# Patient Record
Sex: MALE | Race: WHITE | NOT HISPANIC OR LATINO | Employment: OTHER | URBAN - METROPOLITAN AREA
[De-identification: names, ages, dates, MRNs, and addresses within clinical notes are randomized per-mention and may not be internally consistent; named-entity substitution may affect disease eponyms.]

---

## 2019-10-10 ENCOUNTER — HOSPITAL ENCOUNTER (OUTPATIENT)
Dept: GENERAL RADIOLOGY | Facility: HOSPITAL | Age: 49
Discharge: HOME | End: 2019-10-10
Attending: FAMILY MEDICINE

## 2019-10-10 ENCOUNTER — CONVERSION ENCOUNTER (OUTPATIENT)
Dept: FAMILY MEDICINE CLINIC | Facility: CLINIC | Age: 49
End: 2019-10-10

## 2019-10-10 ENCOUNTER — OFFICE VISIT CONVERTED (OUTPATIENT)
Dept: FAMILY MEDICINE CLINIC | Facility: CLINIC | Age: 49
End: 2019-10-10
Attending: FAMILY MEDICINE

## 2019-10-10 ENCOUNTER — HOSPITAL ENCOUNTER (OUTPATIENT)
Dept: FAMILY MEDICINE CLINIC | Facility: CLINIC | Age: 49
Discharge: HOME | End: 2019-10-10
Attending: FAMILY MEDICINE

## 2019-10-10 LAB
ALBUMIN SERPL-MCNC: 4.8 G/DL (ref 3.5–5)
ALBUMIN/GLOB SERPL: 2.1 {RATIO} (ref 1.4–2.6)
ALP SERPL-CCNC: 83 U/L (ref 53–128)
ALT SERPL-CCNC: 34 U/L (ref 10–40)
ANION GAP SERPL CALC-SCNC: 17 MMOL/L (ref 8–19)
AST SERPL-CCNC: 36 U/L (ref 15–50)
BILIRUB SERPL-MCNC: 0.5 MG/DL (ref 0.2–1.3)
BUN SERPL-MCNC: 10 MG/DL (ref 5–25)
BUN/CREAT SERPL: 9 {RATIO} (ref 6–20)
CALCIUM SERPL-MCNC: 9.8 MG/DL (ref 8.7–10.4)
CHLORIDE SERPL-SCNC: 105 MMOL/L (ref 99–111)
CHOLEST SERPL-MCNC: 142 MG/DL (ref 107–200)
CHOLEST/HDLC SERPL: 3 {RATIO} (ref 3–6)
CONV CO2: 27 MMOL/L (ref 22–32)
CONV TOTAL PROTEIN: 7.1 G/DL (ref 6.3–8.2)
CREAT UR-MCNC: 1.07 MG/DL (ref 0.7–1.2)
GFR SERPLBLD BASED ON 1.73 SQ M-ARVRAT: >60 ML/MIN/{1.73_M2}
GLOBULIN UR ELPH-MCNC: 2.3 G/DL (ref 2–3.5)
GLUCOSE SERPL-MCNC: 96 MG/DL (ref 70–99)
HDLC SERPL-MCNC: 48 MG/DL (ref 40–60)
LDLC SERPL CALC-MCNC: 76 MG/DL (ref 70–100)
OSMOLALITY SERPL CALC.SUM OF ELEC: 297 MOSM/KG (ref 273–304)
POTASSIUM SERPL-SCNC: 4.7 MMOL/L (ref 3.5–5.3)
SODIUM SERPL-SCNC: 144 MMOL/L (ref 135–147)
T4 FREE SERPL-MCNC: 1.5 NG/DL (ref 0.9–1.8)
TRIGL SERPL-MCNC: 91 MG/DL (ref 40–150)
TSH SERPL-ACNC: 2.2 M[IU]/L (ref 0.27–4.2)
VLDLC SERPL-MCNC: 18 MG/DL (ref 5–37)

## 2019-10-29 ENCOUNTER — OFFICE VISIT CONVERTED (OUTPATIENT)
Dept: NEUROLOGY | Facility: CLINIC | Age: 49
End: 2019-10-29
Attending: PSYCHIATRY & NEUROLOGY

## 2020-09-04 ENCOUNTER — OFFICE VISIT CONVERTED (OUTPATIENT)
Dept: FAMILY MEDICINE CLINIC | Facility: CLINIC | Age: 50
End: 2020-09-04
Attending: FAMILY MEDICINE

## 2020-12-07 ENCOUNTER — OFFICE VISIT CONVERTED (OUTPATIENT)
Dept: FAMILY MEDICINE CLINIC | Facility: CLINIC | Age: 50
End: 2020-12-07
Attending: FAMILY MEDICINE

## 2020-12-07 ENCOUNTER — HOSPITAL ENCOUNTER (OUTPATIENT)
Dept: FAMILY MEDICINE CLINIC | Facility: CLINIC | Age: 50
Discharge: HOME | End: 2020-12-07
Attending: FAMILY MEDICINE

## 2020-12-07 LAB
BASOPHILS # BLD AUTO: 0.04 10*3/UL (ref 0–0.2)
BASOPHILS NFR BLD AUTO: 0.6 % (ref 0–3)
CONV ABS IMM GRAN: 0.01 10*3/UL (ref 0–0.2)
CONV IMMATURE GRAN: 0.2 % (ref 0–1.8)
DEPRECATED RDW RBC AUTO: 37.3 FL (ref 35.1–43.9)
EOSINOPHIL # BLD AUTO: 0.19 10*3/UL (ref 0–0.7)
EOSINOPHIL # BLD AUTO: 3 % (ref 0–7)
ERYTHROCYTE [DISTWIDTH] IN BLOOD BY AUTOMATED COUNT: 11.9 % (ref 11.6–14.4)
HCT VFR BLD AUTO: 49.8 % (ref 42–52)
HGB BLD-MCNC: 16.6 G/DL (ref 14–18)
LYMPHOCYTES # BLD AUTO: 2.41 10*3/UL (ref 1–5)
LYMPHOCYTES NFR BLD AUTO: 38.5 % (ref 20–45)
MCH RBC QN AUTO: 28.4 PG (ref 27–31)
MCHC RBC AUTO-ENTMCNC: 33.3 G/DL (ref 33–37)
MCV RBC AUTO: 85.1 FL (ref 80–96)
MONOCYTES # BLD AUTO: 0.43 10*3/UL (ref 0.2–1.2)
MONOCYTES NFR BLD AUTO: 6.9 % (ref 3–10)
NEUTROPHILS # BLD AUTO: 3.18 10*3/UL (ref 2–8)
NEUTROPHILS NFR BLD AUTO: 50.8 % (ref 30–85)
NRBC CBCN: 0 % (ref 0–0.7)
PLATELET # BLD AUTO: 235 10*3/UL (ref 130–400)
PMV BLD AUTO: 10.3 FL (ref 9.4–12.4)
RBC # BLD AUTO: 5.85 10*6/UL (ref 4.7–6.1)
WBC # BLD AUTO: 6.26 10*3/UL (ref 4.8–10.8)

## 2020-12-08 LAB
ALBUMIN SERPL-MCNC: 5 G/DL (ref 3.5–5)
ALBUMIN/GLOB SERPL: 2.2 {RATIO} (ref 1.4–2.6)
ALP SERPL-CCNC: 86 U/L (ref 53–128)
ALT SERPL-CCNC: 40 U/L (ref 10–40)
ANION GAP SERPL CALC-SCNC: 16 MMOL/L (ref 8–19)
AST SERPL-CCNC: 45 U/L (ref 15–50)
BILIRUB SERPL-MCNC: 1.13 MG/DL (ref 0.2–1.3)
BUN SERPL-MCNC: 17 MG/DL (ref 5–25)
BUN/CREAT SERPL: 14 {RATIO} (ref 6–20)
CALCIUM SERPL-MCNC: 10 MG/DL (ref 8.7–10.4)
CHLORIDE SERPL-SCNC: 101 MMOL/L (ref 99–111)
CHOLEST SERPL-MCNC: 138 MG/DL (ref 107–200)
CHOLEST/HDLC SERPL: 3.2 {RATIO} (ref 3–6)
CONV CO2: 27 MMOL/L (ref 22–32)
CONV TOTAL PROTEIN: 7.3 G/DL (ref 6.3–8.2)
CREAT UR-MCNC: 1.24 MG/DL (ref 0.7–1.2)
GFR SERPLBLD BASED ON 1.73 SQ M-ARVRAT: >60 ML/MIN/{1.73_M2}
GLOBULIN UR ELPH-MCNC: 2.3 G/DL (ref 2–3.5)
GLUCOSE SERPL-MCNC: 85 MG/DL (ref 70–99)
HDLC SERPL-MCNC: 43 MG/DL (ref 40–60)
LDLC SERPL CALC-MCNC: 76 MG/DL (ref 70–100)
OSMOLALITY SERPL CALC.SUM OF ELEC: 291 MOSM/KG (ref 273–304)
POTASSIUM SERPL-SCNC: 4.3 MMOL/L (ref 3.5–5.3)
SODIUM SERPL-SCNC: 140 MMOL/L (ref 135–147)
T4 FREE SERPL-MCNC: 1.7 NG/DL (ref 0.9–1.8)
TRIGL SERPL-MCNC: 93 MG/DL (ref 40–150)
TSH SERPL-ACNC: 2.7 M[IU]/L (ref 0.27–4.2)
VLDLC SERPL-MCNC: 19 MG/DL (ref 5–37)

## 2021-05-13 NOTE — PROGRESS NOTES
Progress Note      Patient Name: Darrick Mooney   Patient ID: [id number]   Sex: Male   YOB: 1970        Visit Date: September 4, 2020    Provider: Wodo Martines DO   Location: [facility]   Location Address: [address] [place] [address]  [id number]   Location Phone: [phone]          Chief Complaint  · Follow up - RLS, HLD, Hypothyroid, Migraine Headache, Pain/numbness/tingling in both wrists   · pt c/o lower back pain worsening       History Of Present Illness  Darrick Mooney is a 50 year old male who presents for evaluation and treatment of: Hypothyroid and HLD. He states that he has been taking both meds daily as prescribed.      He states that his lower back is worse. He states that he got up off the sofa and had onset of pain. The pain radiates down one leg or the other. On the right side it radiates to the front. It is worse with twisting motion. He had some old Tramadol which he has been using, plus herbal remedy. He is also taking his daily Celebrex and Tylenol.       Past Medical History  Disease Name Date Onset Notes   Hyperlipemia --  --    Hypothyroid --  --    Labral tear of hip joint --  --    Migraine headache --  --    RLS (restless legs syndrome) --  --    Sciatica --  --    Smokeless tobacco use 10/10/2019 encouraged him to quit.          Past Surgical History  Procedure Name Date Notes   Colonoscopy 6/5/2011 --    Elbow Surgery 2014 left   nose 2002 deviated septum          Medication List  Name Date Started Instructions   amitriptyline 10 mg oral tablet 10/10/2019 take 1 tablet (10 mg) by oral route once daily at bedtime for 90 days   atorvastatin 20 mg oral tablet 10/10/2019 take 1 tablet (20 mg) by oral route once daily at bedtime for 90 days   Celebrex 200 mg oral capsule 10/10/2019 take 1 capsule (200 mg) by oral route once daily for 90 days   Synthroid 112 mcg oral tablet 10/10/2019 take 1 tablet (112 mcg) by oral route once  "daily for 90 days   tramadol 50 mg oral tablet  take 0.5 tablet by oral route every 4-6 hours as needed         Allergy List  Allergen Name Date Reaction Notes   NO KNOWN DRUG ALLERGIES --  --  --          Family Medical History  Disease Name Relative/Age Notes   Family history of cancer Grandmother (paternal)/  Uncle/   --    Family history of heart disease Aunt/  Grandmother (maternal)/  Mother/  Uncle/   --    Family history of diabetes mellitus Grandmother (maternal)/   --    Family history of hypertension Grandmother (maternal)/   --          Social History  Finding Status Start/Stop Quantity Notes   Alcohol Current some day --/-- --  2 ounces of hard liqour per week    Tobacco Current every day --/-- 1can e2d smokless chew tobacco         Immunizations  NameDate Admin Mfg Trade Name Lot Number Route Inj VIS Given VIS Publication   Xliahsasi56/30/2019 [employer] Fluarix, quadrivalent, preservative free [id number] NE NE 09/30/2019    Comments: doug recivied at [facility]         Review of Systems  · Constitutional  o Denies  o : fatigue, fever, chills  · Eyes  o Denies  o : changes in vision  · HENT  o Admits  o : headaches  · Cardiovascular  o Denies  o : chest pain, irregular heart beats, rapid heart rate  · Respiratory  o Denies  o : shortness of breath, wheezing, cough, dyspnea on exertion  · Gastrointestinal  o Admits  o : abdominal pain  o Denies  o : nausea, vomiting, diarrhea, constipation  · Genitourinary  o Admits  o : frequency, dysuria, nocturia, decreased stream  o Denies  o : urgency, hematuria  · Musculoskeletal  o Admits  o : back pain      Vitals  Date Time BP Position Site L\R Cuff Size HR RR TEMP (F) WT  HT  BMI kg/m2 BSA m2 O2 Sat        10/10/2019 09:11 /76 Sitting    65 - R  97.9 184lbs 6oz 5'  9\" 27.23 2.02 100 %    09/04/2020 10:43 /81 Sitting    82 - R  98.2 192lbs 2oz 5'  9\" 28.37 2.06 98 %          Physical Examination  · Constitutional  o Appearance  o : well-nourished, " well developed, alert, in no acute distress, well-tended appearance  · Head and Face  o Head  o :   § Inspection  § : atraumatic, normocephalic  o Face  o :   § Inspection  § : no facial lesions  o HEENT  o : Unremarkable  · Eyes  o Conjunctivae  o : conjunctivae normal  o Sclerae  o : sclerae white  o Pupils and Irises  o : pupils equal and round, pupils reactive to light bilaterally  o Eyelids/Ocular Adnexae  o : eyelid appearance normal  · Ears, Nose, Mouth and Throat  o Ears  o :   § External Ears  § : appearance within normal limits, no lesions present  § Otoscopic Examination  § : tympanic membrane appearance within normal limits bilaterally without perforations, mobility normal  o Nose  o :   § External Nose  § : appearance normal  o Oral Cavity  o :   § Oral Mucosa  § : oral mucosa normal  § Lips  § : lip appearance normal  § Teeth  § : normal dentition for age  § Gums  § : gums pink, non-swollen, no bleeding present  § Tongue  § : tongue appearance normal  § Palate  § : hard palate normal, soft palate appearance normal  o Throat  o :   § Oropharynx  § : no inflammation or lesions present, tonsils within normal limits  · Neck  o Inspection/Palpation  o : normal appearance, no masses or tenderness, trachea midline  o Thyroid  o : gland size normal, nontender, no nodules or masses present on palpation  · Respiratory  o Respiratory Effort  o : breathing unlabored  o Auscultation of Lungs  o : normal breath sounds  · Cardiovascular  o Heart  o :   § Auscultation of Heart  § : regular rate, normal rhythm, no murmurs present  o Peripheral Vascular System  o :   § Extremities  § : no edema  · Gastrointestinal  o Abdominal Examination  o : diffuse abdominal tenderness to palpation present, normal bowel sounds, tone normal without rigidity or guarding, no masses present  o Liver and spleen  o : no hepatomegaly present  o Special Tests  o : (+) CVA tenderness  · Lymphatic  o Neck  o : no lymphadenopathy    · Back  o Inspection  o : no deformities  o Palpation  o : tednerness present           Results  · In-Office Procedures  o Lab procedure  § IOP - Urinalysis without Microscopy (Clinitek) [facility] ([id number])   § Color Ur: Yellow   § Clarity Ur: Clear   § Glucose Ur Ql Strip: Negative   § Bilirub Ur Ql Strip: Negative   § Ketones Ur Ql Strip: Negative   § Sp Gr Ur Qn: 1.025   § Hgb Ur Ql Strip: Negative   § pH Ur-LsCnc: 6.0   § Prot Ur Ql Strip: Negative   § Urobilinogen Ur Strip-mCnc: 0.2 E.U./dL   § Nitrite Ur Ql Strip: Negative   § WBC Est Ur Ql Strip: Negative       Assessment  · Screening for depression     V79.0/Z13.89  · BPH (benign prostatic hyperplasia)     600.00/N40.0  will add Flomax  · Hypothyroid     244.9/E03.9  will update his lipid profile  · RLS (restless legs syndrome)     333.94/G25.81  He stopped the meds, but had severe cramps  · Hyperlipemia     272.4/E78.5  will update   · Migraine headache     346.90/G43.909  · Sciatica     724.3/M54.30  IF his UA is negative then will treat as such  · Costovertebral angle tenderness     724.5/M54.9  will check a UA. He does have a h/o kidney stones      Plan  · Orders  o ACO-18: Positive screen for clinical depression using a standardized tool and a follow-up plan documented () - V79.0/Z13.89 - 09/04/2020  o CMP [facility] ([id number]) - 272.4/E78.5 - 09/04/2020  o Lipid Panel [facility] ([id number]) - 244.9/E03.9, 272.4/E78.5 - 09/04/2020  o Thyroid Profile (THYII, 58631, 49451) - 244.9/E03.9 - 09/04/2020  o ACO-39: Current medications updated and reviewed () - - 09/04/2020  · Medications  o cyclobenzaprine 5 mg oral tablet   SIG: take 1 tablet (5 mg) by oral route 3 times per day for 7 days   DISP: (21) tablets with 0 refills  Prescribed on 09/04/2020     o Medrol (Hong) 4 mg oral tablets,dose pack   SIG: take by oral route as directed per package instructions for 6 days   DISP: (1) 21 ct dose-pack with 0 refills  Prescribed on 09/04/2020     o tamsulosin 0.4 mg oral capsule    SIG: take 1 capsule (0.4 mg) by oral route once daily 1/2 hour following the same meal each day for 90 days   DISP: (90) capsules with 1 refills  Prescribed on 09/04/2020     o Medications have been Reconciled  o Transition of Care or Provider Policy  · Instructions  o Depression Screen completed and scanned into the EMR under the designated folder within the patient's documents.  o Today's PHQ-9 result is 6  o Patient is taking medications as prescribed and doing well.   o Take all medications as prescribed/directed.  o Patient instructed/educated on their diet and exercise program.  o Patient was educated/instructed on their diagnosis, treatment and medications prior to discharge from the clinic today.  o Patient instructed to seek medical attention urgently for new or worsening symptoms.  o Call the office with any concerns or questions.  o Bring all medicines with their bottles to each office visit.  · Disposition  o Call or Return if symptoms worsen or persist.  o Return Visit Request in/on 3 months +/- 2 days ([id number]).            Electronically Signed by: Wood Martines, DO -Author on September 4, 2020 12:03:14 PM

## 2021-05-13 NOTE — PROGRESS NOTES
Progress Note      Patient Name: Darrick Mooney   Patient ID: [id number]   Sex: Male   YOB: 1970    Primary Care Provider: Wood Martines DO    Visit Date: December 7, 2020    Provider: Wood Martines DO   Location: [facility]   Location Address: [address] [place] [address]  [id number]   Location Phone: [phone]          Chief Complaint  · f/u sciatica and BPH      History Of Present Illness  Darrick Mooney is a 50 year old /White male who presents for evaluation and treatment of: sciatica. He states that his back pain continues. It is worse after prolonged sitting. He has been to Pain Management and had injections with some transient benefit.      BPH Last visit added Flomax and his urine stream is better. He has retrograde ejaculation.       Past Medical History  Disease Name Date Onset Notes   Hyperlipemia --  --    Hypothyroid --  --    Labral tear of hip joint --  --    Migraine headache --  --    RLS (restless legs syndrome) --  --    Sciatica --  --    Smokeless tobacco use 10/10/2019 encouraged him to quit.          Past Surgical History  Procedure Name Date Notes   Colonoscopy 6/5/2011 --    Elbow Surgery 2014 left   nose 2002 deviated septum          Medication List  Name Date Started Instructions   amitriptyline 10 mg oral tablet 09/04/2020 take 1 tablet (10 mg) by oral route once daily at bedtime for 90 days   atorvastatin 20 mg oral tablet 09/04/2020 take 1 tablet (20 mg) by oral route once daily at bedtime for 90 days   Celebrex 200 mg oral capsule 09/04/2020 take 1 capsule (200 mg) by oral route once daily for 90 days   Synthroid 112 mcg oral tablet 09/04/2020 take 1 tablet (112 mcg) by oral route once daily for 90 days   tamsulosin 0.4 mg oral capsule 09/04/2020 take 1 capsule (0.4 mg) by oral route once daily 1/2 hour following the same meal each day for 90 days         Allergy List  Allergen Name Date Reaction Notes  "  NO KNOWN DRUG ALLERGIES --  --  --        Allergies Reconciled  Family Medical History  Disease Name Relative/Age Notes   Family history of cancer Grandmother (paternal)/  Uncle/   --    Family history of heart disease Aunt/  Grandmother (maternal)/  Mother/  Uncle/   --    Family history of diabetes mellitus Grandmother (maternal)/   --    Family history of hypertension Grandmother (maternal)/   --          Social History  Finding Status Start/Stop Quantity Notes   Alcohol Current some day --/-- --  2 ounces of hard liqour per week    Smokeless tobacco --  --/-- 1 can every 2D --    Tobacco Never --/-- 1can e2d smokless chew tobacco         Immunizations  NameDate Admin Mfg Trade Name Lot Number Route Inj VIS Given VIS Publication   Iqhchxqnv25/13/2020 [facility] Fluzone Quadrivalent [id number] IM RD 10/13/2020 08/15/2019   Comments: [facility]: 94108-763-23         Review of Systems  · Constitutional  o Admits  o : fatigue  · Eyes  o Denies  o : changes in vision  · HENT  o Denies  o : headaches  · Cardiovascular  o Denies  o : chest pain, irregular heart beats, rapid heart rate  · Respiratory  o Denies  o : shortness of breath, wheezing, cough, dyspnea on exertion  · Gastrointestinal  o Denies  o : nausea, vomiting, bloating  · Musculoskeletal  o Admits  o : back pain      Vitals  Date Time BP Position Site L\R Cuff Size HR RR TEMP (F) WT  HT  BMI kg/m2 BSA m2 O2 Sat FR L/min FiO2 HC       10/10/2019 09:11 /76 Sitting    65 - R  97.9 184lbs 6oz 5'  9\" 27.23 2.02 100 %  21%    09/04/2020 10:43 /81 Sitting    82 - R  98.2 192lbs 2oz 5'  9\" 28.37 2.06 98 %  21%    12/07/2020 10:35 /82 Sitting    60 - R  97.6 195lbs 16oz 5'  9\" 28.94 2.08 100 %  21%          Physical Examination  · Constitutional  o Appearance  o : well-nourished, well developed, alert, in no acute distress, well-tended appearance  · Head and Face  o Head  o :   § Inspection  § : atraumatic, normocephalic  o Face  o :   § Inspection  § : no " facial lesions  o HEENT  o : Unremarkable  · Eyes  o Conjunctivae  o : conjunctivae normal  o Sclerae  o : sclerae white  o Pupils and Irises  o : pupils equal and round, pupils reactive to light bilaterally  o Eyelids/Ocular Adnexae  o : eyelid appearance normal  · Ears, Nose, Mouth and Throat  o Ears  o :   § External Ears  § : appearance within normal limits, no lesions present  § Otoscopic Examination  § : tympanic membrane appearance within normal limits bilaterally without perforations, mobility normal  o Nose  o :   § External Nose  § : appearance normal  o Oral Cavity  o :   § Oral Mucosa  § : oral mucosa normal  § Lips  § : lip appearance normal  § Teeth  § : normal dentition for age  § Gums  § : gums pink, non-swollen, no bleeding present  § Tongue  § : tongue appearance normal  § Palate  § : hard palate normal, soft palate appearance normal  o Throat  o :   § Oropharynx  § : no inflammation or lesions present, tonsils within normal limits  · Neck  o Inspection/Palpation  o : normal appearance, no masses or tenderness, trachea midline  o Thyroid  o : gland size normal, nontender, no nodules or masses present on palpation  · Respiratory  o Respiratory Effort  o : breathing unlabored  o Auscultation of Lungs  o : normal breath sounds  · Cardiovascular  o Heart  o :   § Auscultation of Heart  § : regular rate, normal rhythm, no murmurs present  o Peripheral Vascular System  o :   § Extremities  § : no edema  · Lymphatic  o Neck  o : no lymphadenopathy           Assessment  · BPH (benign prostatic hyperplasia)     600.00/N40.0  improved  · Fatigue     780.79/R53.83  will update labs  · Hypothyroid     244.9/E03.9  Will update his labs  · Hyperlipemia     272.4/E78.5  will update his labs  · Sciatica     724.3/M54.30  This is persistent. I offered Pain Management again, but has not given him longer term relief therefore he declined. Previously he used Tramadol at the start of his day. Typically no more then  once daily      Plan  · Orders  o CBC with Auto Diff [facility] ([id number]) - 780.79/R53.83 - 12/07/2020  o CMP [facility] ([id number]) - 272.4/E78.5, 780.79/R53.83 - 12/07/2020  o Lipid Panel [facility] ([id number]) - 272.4/E78.5 - 12/07/2020  o Thyroid Profile (51819, 72327, THYII) - 244.9/E03.9, 272.4/E78.5, 780.79/R53.83 - 12/07/2020  o ACO-39: Current medications updated and reviewed (1159F, ) - - 12/07/2020  o ACO-14: Influenza immunization administered or previously received [facility] () - - 12/07/2020  · Medications  o Naprosyn 500 mg oral tablet   SIG: take 1 tablet (500 mg) by oral route 2 times per day with food for 90 days   DISP: (180) Tablet with 3 refills  Prescribed on 12/07/2020     o tramadol 50 mg oral tablet   SIG: take 1 tablet by oral route every 8 hours as needed for 30 days   DISP: (50) Tablet with 5 refills  Prescribed on 12/07/2020     o Celebrex 200 mg oral capsule   SIG: take 1 capsule (200 mg) by oral route once daily for 90 days   DISP: (90) capsule with 3 refills  Discontinued on 12/07/2020     o Medications have been Reconciled  o Transition of Care or Provider Policy  · Instructions  o Patient is taking medications as prescribed and doing well.   o Take all medications as prescribed/directed.  o Patient instructed/educated on their diet and exercise program.  o Patient was educated/instructed on their diagnosis, treatment and medications prior to discharge from the clinic today.  o Patient instructed to seek medical attention urgently for new or worsening symptoms.  o Call the office with any concerns or questions.  o Bring all medicines with their bottles to each office visit.  · Disposition  o Call or Return if symptoms worsen or persist.  o Return Visit Request in/on 6 months +/- 2 days ([id number]).            Electronically Signed by: Wood Martines DO -Author on December 7, 2020 11:34:32 AM

## 2021-05-14 VITALS
WEIGHT: 192.12 LBS | HEIGHT: 69 IN | SYSTOLIC BLOOD PRESSURE: 126 MMHG | OXYGEN SATURATION: 98 % | DIASTOLIC BLOOD PRESSURE: 81 MMHG | BODY MASS INDEX: 28.45 KG/M2 | TEMPERATURE: 98.2 F | HEART RATE: 82 BPM

## 2021-05-14 VITALS
HEIGHT: 69 IN | BODY MASS INDEX: 29.03 KG/M2 | TEMPERATURE: 97.6 F | SYSTOLIC BLOOD PRESSURE: 137 MMHG | WEIGHT: 196 LBS | OXYGEN SATURATION: 100 % | DIASTOLIC BLOOD PRESSURE: 82 MMHG | HEART RATE: 60 BPM

## 2021-05-15 VITALS
HEART RATE: 65 BPM | WEIGHT: 184.37 LBS | BODY MASS INDEX: 27.31 KG/M2 | HEIGHT: 69 IN | DIASTOLIC BLOOD PRESSURE: 76 MMHG | OXYGEN SATURATION: 100 % | SYSTOLIC BLOOD PRESSURE: 124 MMHG | TEMPERATURE: 97.9 F

## 2021-06-07 ENCOUNTER — OFFICE VISIT (OUTPATIENT)
Dept: FAMILY MEDICINE CLINIC | Facility: CLINIC | Age: 51
End: 2021-06-07

## 2021-06-07 VITALS
TEMPERATURE: 97.7 F | OXYGEN SATURATION: 99 % | WEIGHT: 199.8 LBS | HEART RATE: 62 BPM | HEIGHT: 69 IN | BODY MASS INDEX: 29.59 KG/M2 | SYSTOLIC BLOOD PRESSURE: 136 MMHG | DIASTOLIC BLOOD PRESSURE: 77 MMHG

## 2021-06-07 DIAGNOSIS — M54.30 SCIATIC LEG PAIN: Chronic | ICD-10-CM

## 2021-06-07 DIAGNOSIS — E78.5 HYPERLIPIDEMIA, UNSPECIFIED HYPERLIPIDEMIA TYPE: Chronic | ICD-10-CM

## 2021-06-07 DIAGNOSIS — G25.81 RLS (RESTLESS LEGS SYNDROME): Chronic | ICD-10-CM

## 2021-06-07 DIAGNOSIS — Z72.0 SMOKELESS TOBACCO USE: Primary | Chronic | ICD-10-CM

## 2021-06-07 DIAGNOSIS — N40.0 BENIGN PROSTATIC HYPERPLASIA WITHOUT LOWER URINARY TRACT SYMPTOMS: ICD-10-CM

## 2021-06-07 DIAGNOSIS — E03.9 ACQUIRED HYPOTHYROIDISM: Chronic | ICD-10-CM

## 2021-06-07 LAB
CHOLEST SERPL-MCNC: 207 MG/DL (ref 0–200)
HDLC SERPL-MCNC: 36 MG/DL (ref 40–60)
LDLC SERPL CALC-MCNC: 134 MG/DL (ref 0–100)
LDLC/HDLC SERPL: 3.62 {RATIO}
TRIGL SERPL-MCNC: 204 MG/DL (ref 0–150)
VLDLC SERPL-MCNC: 37 MG/DL (ref 5–40)

## 2021-06-07 PROCEDURE — 80061 LIPID PANEL: CPT | Performed by: FAMILY MEDICINE

## 2021-06-07 PROCEDURE — 84443 ASSAY THYROID STIM HORMONE: CPT | Performed by: FAMILY MEDICINE

## 2021-06-07 PROCEDURE — 36415 COLL VENOUS BLD VENIPUNCTURE: CPT | Performed by: FAMILY MEDICINE

## 2021-06-07 PROCEDURE — 99214 OFFICE O/P EST MOD 30 MIN: CPT | Performed by: FAMILY MEDICINE

## 2021-06-07 PROCEDURE — 84439 ASSAY OF FREE THYROXINE: CPT | Performed by: FAMILY MEDICINE

## 2021-06-07 RX ORDER — TRAMADOL HYDROCHLORIDE 50 MG/1
50 TABLET ORAL DAILY
COMMUNITY
Start: 2021-04-22 | End: 2021-07-09 | Stop reason: SDUPTHER

## 2021-06-07 RX ORDER — LEVOTHYROXINE SODIUM 112 MCG
112 TABLET ORAL DAILY
COMMUNITY
Start: 2021-04-22 | End: 2021-07-09 | Stop reason: SDUPTHER

## 2021-06-07 NOTE — PATIENT INSTRUCTIONS
Smokeless Tobacco Information, Adult    Tobacco is a leafy plant that contains a chemical (nicotine). Nicotine affects your brain and causes you to become addicted to it. Smokeless tobacco is tobacco that you put in your mouth instead of smoking it. It may also be called chewing tobacco or snuff.  Smokeless tobacco is made from the leaves of tobacco plants and comes in many forms, such as:  · Loose, dry leaves.  · Plugs or twists.  · Moist pouches.  · Dissolving lozenges or strips.  Chewing, sucking, or holding the tobacco in your mouth causes your mouth to make more saliva. The saliva mixes with the tobacco, which you swallow or spit out. Using tobacco is harmful to your health.  How can smokeless tobacco affect me?  All forms of tobacco contain many chemicals that can harm every organ in the body. Using smokeless tobacco increases your risk for:  · Cancer. Tobacco use is one of the leading causes of cancer. Smokeless tobacco is linked to cancer of the mouth, esophagus, and pancreas.  · Other long-term health problems, including high blood pressure, heart disease, and stroke.  · Addiction.  · Pregnancy complications, if this applies. Pregnant women who use smokeless tobacco are more likely to have a miscarriage or deliver a baby too early.  · Mouth and dental problems, such as:  ? Bad breath.  ? Teeth that look yellow or brown.  ? Mouth sores.  ? Cracking and bleeding lips.  ? Gum recession, gum disease, or tooth decay.  ? Lesions on the soft tissues of your mouth (leukoplakia).  The benefits of not using smokeless tobacco include:  · A healthy mind and body.  · Saving money. You avoid the cost of buying tobacco and the cost of treating illnesses that are caused by tobacco.  What actions can I take to quit using tobacco?  Ask your health care provider for help to quit using smokeless tobacco. This may involve treatment.  These tips may also help you quit:  · Pick a date to quit. Set a date within the next two  weeks. This gives you time to prepare.  · Write down the reasons why you are quitting. Keep this list in places where you will see it often, such as on your bathroom mirror or in your car or wallet.  · Identify the people, places, things, and activities that make you want to use smokeless tobacco (triggers). Avoid them.  · Get rid of any tobacco you have and remove any tobacco smells. To do this:  ? Throw away all containers of tobacco at home, at work, and in your car.  ? Throw away any other items that you use regularly when you chew tobacco.  ? Clean your car and make sure to remove all tobacco-related items.  ? Clean your home, including curtains and carpets.  · Tell your family and friends that you are quitting. Having support can make quitting easier.  · Chew sugarless gum or sunflower seeds when you want to use smokeless tobacco.  · Stay positive. Be prepared for cravings. It is common to slip up when you first quit, so take it one day at a time.  · Stay busy and take care of your body. Get plenty of exercise. Drink enough water to keep your urine pale yellow.  · Keep track of how many days have passed since you quit. Remembering how long and hard you have worked to quit can help you avoid using smokeless tobacco again.  Where to find support  Ask your health care provider if there is a local support group for quitting smokeless tobacco.  Where to find more information  You can learn more about the risks of using smokeless tobacco and the benefits of quitting from these sources:  · American Cancer Society: www.cancer.org  · National Cancer [place]: www.cancer.gov  · Centers for Disease Control and Prevention: www.cdc.gov  Contact a health care provider if you have:  · Trouble quitting smokeless tobacco use on your own.  · White or other discolored patches in your mouth.  · Difficulty swallowing.  · A change in your voice.  · Unexplained weight loss.  · Stomach pain, nausea, or  vomiting.  Summary  · Smokeless tobacco contains many different chemicals that are known to cause cancer.  · Nicotine is an addictive chemical in smokeless tobacco that affects your brain.  · The benefits of not using smokeless tobacco include having a healthy mind and body and saving money.  · Tell your family and friends that you are quitting. Having support can make quitting easier.  · Ask your health care provider for help quitting smokeless tobacco. This may involve treatment.  This information is not intended to replace advice given to you by your health care provider. Make sure you discuss any questions you have with your health care provider.  Document Revised: 09/11/2020 Document Reviewed: 02/24/2020  [employer] Patient Education © 2021 [employer]

## 2021-06-07 NOTE — PROGRESS NOTES
Chief Complaint   Patient presents with   • Benign Prostatic Hypertrophy   • Sciatica   • Restless Legs Syndrome        Subjective     Darrick Mooney  has a past medical history of BPH (benign prostatic hyperplasia) (12/07/2020), Hyperlipemia, Hypothyroid, Labral tear of hip joint, Migraine headache, RLS (restless legs syndrome), Sciatica, and Smokeless tobacco use (10/10/2019).    HPI    PHQ-2 Depression Screening  Little interest or pleasure in doing things? 0   Feeling down, depressed, or hopeless? 0   PHQ-2 Total Score 0   PHQ-9 Depression Screening  Little interest or pleasure in doing things? 0   Feeling down, depressed, or hopeless? 0   Trouble falling or staying asleep, or sleeping too much?     Feeling tired or having little energy?     Poor appetite or overeating?     Feeling bad about yourself - or that you are a failure or have let yourself or your family down?     Trouble concentrating on things, such as reading the newspaper or watching television?     Moving or speaking so slowly that other people could have noticed? Or the opposite - being so fidgety or restless that you have been moving around a lot more than usual?     Thoughts that you would be better off dead, or of hurting yourself in some way?     PHQ-9 Total Score 0   If you checked off any problems, how difficult have these problems made it for you to do your work, take care of things at home, or get along with other people?       No Known Allergies    Prior to Admission medications    Medication Sig Start Date End Date Taking? Authorizing Provider   Synthroid 112 MCG tablet Take 112 mcg by mouth Daily. 4/22/21  Yes Augie Rivera MD   traMADol (ULTRAM) 50 MG tablet Take 50 mg by mouth Daily. 4/22/21   Augie Rivera MD        Patient Active Problem List   Diagnosis   • Hyperlipidemia   • RLS (restless legs syndrome)   • Acquired hypothyroidism   • Sciatic leg pain   • Benign prostatic hyperplasia without lower urinary  "tract symptoms   • Smokeless tobacco use        Past Surgical History:   Procedure Laterality Date   • COLONOSCOPY  06/05/2011   • ELBOW ARTHROPLASTY Left 2014   • NOSE SURGERY  2002    deviated septum       Social History     Socioeconomic History   • Marital status:      Spouse name: Not on file   • Number of children: Not on file   • Years of education: Not on file   • Highest education level: Not on file   Tobacco Use   • Smoking status: Never Smoker   • Smokeless tobacco: Current User     Types: Chew   • Tobacco comment: 1 can every 2 days   Vaping Use   • Vaping Use: Never used   Substance and Sexual Activity   • Alcohol use: Yes     Comment: occaionally    • Drug use: Never       Family History   Problem Relation Age of Onset   • Heart disease Mother    • Heart disease Maternal Grandmother    • Diabetes Maternal Grandmother         mellitus   • Hypertension Maternal Grandmother    • Cancer Paternal Grandmother    • Cancer Other    • Heart disease Other    • Heart disease Other        Family history, surgical history, past medical history, Allergies and med's reviewed with patient today and updated in [facility].     ROS:  Review of Systems    OBJECTIVE:  Vitals:    06/07/21 1012   BP: 136/77   BP Location: Left arm   Patient Position: Sitting   Cuff Size: Adult   Pulse: 62   Temp: 97.7 °F (36.5 °C)   SpO2: 99%   Weight: 90.6 kg (199 lb 12.8 oz)   Height: 175.3 cm (69\")     No exam data present   Body mass index is 29.51 kg/m².  No LMP for male patient.    Physical Exam    Procedures    Office Visit on 06/07/2021   Component Date Value Ref Range Status   • Total Cholesterol 06/07/2021 207* 0 - 200 mg/dL Final   • Triglycerides 06/07/2021 204* 0 - 150 mg/dL Final   • HDL Cholesterol 06/07/2021 36* 40 - 60 mg/dL Final   • LDL Cholesterol  06/07/2021 134* 0 - 100 mg/dL Final   • VLDL Cholesterol 06/07/2021 37  5 - 40 mg/dL Final   • LDL/HDL Ratio 06/07/2021 3.62   Final   • TSH 06/07/2021 2.660  0.270 - " 4.200 uIU/mL Final   • Free T4 06/07/2021 1.36  0.93 - 1.70 ng/dL Final    T4 results may be falsely increased if patient taking Biotin.       ASSESSMENT/ PLAN:    Diagnoses and all orders for this visit:    1. Smokeless tobacco use (Primary)  Comments:  We discussed the ill affects of this and potential health risks    2. Hyperlipidemia, unspecified hyperlipidemia type  Comments:  will update his labs  Orders:  -     Lipid Panel  -     TSH+Free T4    3. Acquired hypothyroidism  Comments:  will update thyroid profile  Orders:  -     TSH+Free T4    4. RLS (restless legs syndrome)  Comments:  this is probably from his back and not true RLS  Orders:  -     MRI Lumbar Spine Without Contrast; Future    5. Sciatic leg pain  Comments:  It has been 2 yrs since his last MRI. Will thus get an updated one  Orders:  -     MRI Lumbar Spine Without Contrast; Future    6. Benign prostatic hyperplasia without lower urinary tract symptoms  Comments:  Improved and he has thus stopped his medication        Orders Placed Today:     No orders of the defined types were placed in this encounter.       Management Plan:     An After Visit Summary was printed and given to the patient at discharge.    Follow-up: Return in about 6 months (around 12/7/2021) for Recheck.    Wood Martines,  6/14/2021 17:38 EDT  This note was electronically signed.Labs look good.

## 2021-06-07 NOTE — PROGRESS NOTES
"Chief Complaint  Benign Prostatic Hypertrophy, Sciatica, and Restless Legs Syndrome    Subjective          Darrick Mooney presents to [facility]  RLS is persistent. He styates that he feels like his calf muscle are twitching constantly.     Benign Prostatic Hypertrophy  This is a chronic problem. The current episode started more than 1 year ago. The problem has been resolved since onset. Irritative symptoms include nocturia. Irritative symptoms do not include frequency. Obstructive symptoms include an intermittent stream. Obstructive symptoms do not include dribbling, a slower stream or a weak stream. Pertinent negatives include no hematuria. He is sexually active. Nothing aggravates the symptoms. Past treatments include tamsulosin. The treatment provided significant relief. He has been using treatment for 1 to 6 months.   Sciatica  This is a chronic problem. The current episode started more than 1 year ago. The problem occurs constantly. The problem has been gradually worsening. Exacerbated by: prolonged sitting. He has tried NSAIDs and ice for the symptoms.   Hyperlipidemia  This is a chronic problem. The current episode started more than 1 year ago. The problem is uncontrolled. Exacerbating diseases include hypothyroidism. There are no known factors aggravating his hyperlipidemia. Pertinent negatives include no leg pain. He is currently on no antihyperlipidemic treatment. Risk factors for coronary artery disease include dyslipidemia.   Hypothyroidism  This is a chronic problem. The current episode started more than 1 year ago. The problem occurs constantly. The problem has been unchanged.       Objective   Vital Signs:   /77 (BP Location: Left arm, Patient Position: Sitting, Cuff Size: Adult)   Pulse 62   Temp 97.7 °F (36.5 °C)   Ht 175.3 cm (69\")   Wt 90.6 kg (199 lb 12.8 oz)   SpO2 99%   BMI 29.51 kg/m²     Physical Exam  Constitutional:       General: He is " not in acute distress.     Appearance: Normal appearance. He is normal weight.   HENT:      Head: Normocephalic and atraumatic.      Right Ear: Tympanic membrane, ear canal and external ear normal. There is no impacted cerumen.      Left Ear: Tympanic membrane, ear canal and external ear normal. There is no impacted cerumen.      Nose: Nose normal.      Mouth/Throat:      Mouth: Mucous membranes are moist.      Pharynx: Oropharynx is clear.   Eyes:      General: No scleral icterus.     Conjunctiva/sclera: Conjunctivae normal.      Pupils: Pupils are equal, round, and reactive to light.   Cardiovascular:      Rate and Rhythm: Normal rate and regular rhythm.      Pulses: Normal pulses.      Heart sounds: Normal heart sounds. No murmur heard.     Pulmonary:      Effort: Pulmonary effort is normal. No respiratory distress.      Breath sounds: Normal breath sounds. No wheezing, rhonchi or rales.   Musculoskeletal:      Cervical back: No tenderness.   Lymphadenopathy:      Cervical: No cervical adenopathy.   Neurological:      Mental Status: He is alert.        Result Review :                 Assessment and Plan    There are no diagnoses linked to this encounter.    Follow Up   No follow-ups on file.  Patient was given instructions and counseling regarding his condition or for health maintenance advice. Please see specific information pulled into the AVS if appropriate.

## 2021-06-08 ENCOUNTER — TELEPHONE (OUTPATIENT)
Dept: FAMILY MEDICINE CLINIC | Facility: CLINIC | Age: 51
End: 2021-06-08

## 2021-06-08 LAB
T4 FREE SERPL-MCNC: 1.36 NG/DL (ref 0.93–1.7)
TSH SERPL DL<=0.05 MIU/L-ACNC: 2.66 UIU/ML (ref 0.27–4.2)

## 2021-06-23 ENCOUNTER — HOSPITAL ENCOUNTER (OUTPATIENT)
Dept: MRI IMAGING | Facility: HOSPITAL | Age: 51
Discharge: HOME | End: 2021-06-23
Admitting: FAMILY MEDICINE

## 2021-06-23 DIAGNOSIS — G25.81 RLS (RESTLESS LEGS SYNDROME): Chronic | ICD-10-CM

## 2021-06-23 DIAGNOSIS — M54.30 SCIATIC LEG PAIN: Chronic | ICD-10-CM

## 2021-06-23 PROCEDURE — 72148 MRI LUMBAR SPINE W/O DYE: CPT

## 2021-06-24 ENCOUNTER — TELEPHONE (OUTPATIENT)
Dept: FAMILY MEDICINE CLINIC | Facility: CLINIC | Age: 51
End: 2021-06-24

## 2021-06-24 DIAGNOSIS — M51.36 BULGING LUMBAR DISC: Primary | ICD-10-CM

## 2021-06-24 NOTE — TELEPHONE ENCOUNTER
----- Message from Wood Martines DO sent at 6/24/2021  7:39 AM EDT -----  He has a bulging disc at L4 compressing the right L4 nerve root.  He needs to see the neurosurgeon.

## 2021-07-09 DIAGNOSIS — E03.9 ACQUIRED HYPOTHYROIDISM: ICD-10-CM

## 2021-07-09 DIAGNOSIS — M54.30 SCIATIC LEG PAIN: Primary | ICD-10-CM

## 2021-07-09 PROBLEM — S73.199A LABRAL TEAR OF HIP JOINT: Status: ACTIVE | Noted: 2021-07-09

## 2021-07-09 PROBLEM — G43.909 MIGRAINE HEADACHE: Status: ACTIVE | Noted: 2021-07-09

## 2021-07-09 RX ORDER — TRAMADOL HYDROCHLORIDE 50 MG/1
50 TABLET ORAL DAILY
Qty: 30 TABLET | Refills: 0 | Status: SHIPPED | OUTPATIENT
Start: 2021-07-09 | End: 2021-07-16 | Stop reason: SDUPTHER

## 2021-07-09 RX ORDER — LEVOTHYROXINE SODIUM 112 MCG
112 TABLET ORAL DAILY
Qty: 90 TABLET | Refills: 0 | Status: SHIPPED | OUTPATIENT
Start: 2021-07-09 | End: 2021-10-12 | Stop reason: SDUPTHER

## 2021-07-09 NOTE — TELEPHONE ENCOUNTER
Caller: Darrick Mooney    Relationship: Self    Best call back number: [phone] THIS IS THE SPOUSE'S NUMBER AND PATIENT REQUESTS IF A NEED TO CALL TO CALL HER    Medication needed:   Requested Prescriptions     Pending Prescriptions Disp Refills   • Synthroid 112 MCG tablet       Sig: Take 1 tablet by mouth Daily.   • traMADol (ULTRAM) 50 MG tablet       Sig: Take 1 tablet by mouth Daily.       When do you need the refill by: ASAP    What additional details did the patient provide when requesting the medication: PATIENT SAW PCP 67/21 AND  INFORMS HIS PHARMACY DID NOT RECEIVE REFILLS. ALSO HE ONLY HAS 4 DAYS OF SYNTHROID REMAINING    Does the patient have less than a 3 day supply:  [] Yes  [x] No    What is the patient's preferred pharmacy: [facility] [place] - [address] [facility] - [phone] [facility] [phone] FX

## 2021-07-16 ENCOUNTER — TELEPHONE (OUTPATIENT)
Dept: FAMILY MEDICINE CLINIC | Facility: CLINIC | Age: 51
End: 2021-07-16

## 2021-07-16 DIAGNOSIS — M54.30 SCIATIC LEG PAIN: ICD-10-CM

## 2021-07-16 RX ORDER — TRAMADOL HYDROCHLORIDE 50 MG/1
50 TABLET ORAL DAILY
Qty: 30 TABLET | Refills: 0 | Status: SHIPPED | OUTPATIENT
Start: 2021-07-16 | End: 2021-09-09 | Stop reason: SDUPTHER

## 2021-07-19 ENCOUNTER — TELEPHONE (OUTPATIENT)
Dept: FAMILY MEDICINE CLINIC | Facility: CLINIC | Age: 51
End: 2021-07-19

## 2021-07-19 NOTE — TELEPHONE ENCOUNTER
Caller: SAHRA WEISS    Relationship: Emergency Contact    Best call back number: [phone]    What was the call regarding: PATIENTS WIFE WOULD LIKE A CALL BACK AS SHE HAS QUESTION REGARDING HER HUSBANDS TRAMADOL REFILL    Do you require a callback: YES PLEASE CALL AND ADVISE

## 2021-07-19 NOTE — TELEPHONE ENCOUNTER
There is no particular reason it was just found the prescription came across to be refilled and was refilled.

## 2021-07-19 NOTE — TELEPHONE ENCOUNTER
Pt called and reports that previous rx for tramadol had refills on it but his most recent one did not have any refills. Please advise

## 2021-07-20 NOTE — TELEPHONE ENCOUNTER
All try and recall to send him enough refills next month when he needs it to get him till his next appointment.

## 2021-07-21 NOTE — TELEPHONE ENCOUNTER
Caller: Darrick Mooney    Relationship: Self    Best call back number: [phone]    What is the best time to reach you:  ONLY AFTER 5 PM     Who are you requesting to speak with CLINICAL STAFF       What was the call regarding: PATIENT RETURNING PHONE CALL TO DISCUSS HIS MEDICATION REFILL.      Do you require a callback: YES    HUB WAS UNABLE TO WARM TRANSFER     PATIENT WOULD LIKE AN ANSWER ASAP

## 2021-08-19 ENCOUNTER — PREP FOR SURGERY (OUTPATIENT)
Dept: OTHER | Facility: HOSPITAL | Age: 51
End: 2021-08-19

## 2021-08-19 ENCOUNTER — OFFICE VISIT (OUTPATIENT)
Dept: NEUROSURGERY | Facility: CLINIC | Age: 51
End: 2021-08-19

## 2021-08-19 VITALS
BODY MASS INDEX: 28.76 KG/M2 | DIASTOLIC BLOOD PRESSURE: 77 MMHG | SYSTOLIC BLOOD PRESSURE: 121 MMHG | WEIGHT: 194.2 LBS | HEIGHT: 69 IN

## 2021-08-19 DIAGNOSIS — M48.062 SPINAL STENOSIS OF LUMBAR REGION WITH NEUROGENIC CLAUDICATION: ICD-10-CM

## 2021-08-19 DIAGNOSIS — G89.29 CHRONIC MIDLINE LOW BACK PAIN WITH BILATERAL SCIATICA: Primary | ICD-10-CM

## 2021-08-19 DIAGNOSIS — M48.062 SPINAL STENOSIS OF LUMBAR REGION WITH NEUROGENIC CLAUDICATION: Primary | ICD-10-CM

## 2021-08-19 DIAGNOSIS — M54.41 CHRONIC MIDLINE LOW BACK PAIN WITH BILATERAL SCIATICA: Primary | ICD-10-CM

## 2021-08-19 DIAGNOSIS — M51.36 DDD (DEGENERATIVE DISC DISEASE), LUMBAR: ICD-10-CM

## 2021-08-19 DIAGNOSIS — M54.42 CHRONIC MIDLINE LOW BACK PAIN WITH BILATERAL SCIATICA: Primary | ICD-10-CM

## 2021-08-19 PROCEDURE — 99204 OFFICE O/P NEW MOD 45 MIN: CPT | Performed by: NEUROLOGICAL SURGERY

## 2021-08-19 RX ORDER — CEFAZOLIN SODIUM 2 G/100ML
2 INJECTION, SOLUTION INTRAVENOUS ONCE
Status: CANCELLED | OUTPATIENT
Start: 2021-08-19 | End: 2021-08-19

## 2021-08-19 NOTE — PROGRESS NOTES
"Chief Complaint  Back Pain    Subjective          Darrick Mooney who is a 51 y.o. year old male who presents to [facility] for Evaluation of the Spine.     The patient complains of pain located in the Lumbar Spine.  Patients states the pain has been present for 15 years.  The pain came on gradually.  The pain scale level is 6.  The pain radiates to the BLE, right greater than left.  The pain is waxing/waning and described as electrical shock/numbness.  The pain is worse at no particular time of day. Patient states exercise, heat, tramadol makes the pain better.  Patient states sitting and laying down makes the pain worse.    Associated Symptoms Include: Numbness in the left heel and right big toe  Conservative Interventions Include: Physical Therapy that was effective. and Pain Medications that were somewhat effective.He has also had LESB and ablations-the ablations did not help, but the last epidural injections helped for about 3 months.        History of Previous Spinal Surgery?: No    He reports that he has never smoked. His smokeless tobacco use includes chew.    Review of Systems   Musculoskeletal: Positive for back pain and myalgias.   Neurological: Positive for numbness.   All other systems reviewed and are negative.       Objective   Vital Signs:   /77 (BP Location: Left arm, Patient Position: Sitting)   Ht 175.3 cm (69\")   Wt 88.1 kg (194 lb 3.2 oz)   BMI 28.68 kg/m²       Physical Exam  Cardiovascular:      Comments: No edema  Pulmonary:      Effort: Pulmonary effort is normal.   Musculoskeletal:      Right lower leg: No edema.      Left lower leg: No edema.      Comments: Mild TTP right lower lumbar  SLR on right causes pain in buttock   Neurological:      Mental Status: He is alert.      Sensory: No sensory deficit.      Motor: No weakness.      Deep Tendon Reflexes: Reflexes normal (2/4).   Psychiatric:         Mood and Affect: Mood normal.      "        Result Review :   I personally reviewed the patient's MRI scan which shows DDD at L4-5 with moderate stenosis, greatest on the right.      Assessment and Plan    Diagnoses and all orders for this visit:    1. Chronic midline low back pain with bilateral sciatica (Primary)    2. DDD (degenerative disc disease), lumbar  Comments:  L4-5    We discussed fusion vs. PT vs. Laminectomy. He would like to proceed with laminectomy.     We discussed the importance of smoking/nicotine cessation. Smoking/nicotine use has multiple health risks. In particular related to the spine, nicotine increases the incidence of lower back pain, speeds up the progression of degenerative disc disease and dramatically reduces healing after spine surgery (particularly a fusion operation).       Follow Up   No follow-ups on file.  Patient was given instructions and counseling regarding his condition or for health maintenance advice. Please see specific information pulled into the AVS if appropriate.

## 2021-08-19 NOTE — PROGRESS NOTES
Darrick Mooney is a 51 y.o. male that presents with Back Pain       HPI    Review of Systems   Musculoskeletal: Positive for back pain and myalgias.   Neurological: Positive for numbness.        Vitals:    08/19/21 1503   BP: 121/77        Physical Exam        Assessment and Plan {CC Problem List  Visit Diagnosis  ROS  Review (Popup)  Health Maintenance  Quality  BestPractice  Medications  SmartSets  SnapShot Encounters  Media :23}   Problem List Items Addressed This Visit     None          Follow Up {Instructions Charge Capture  Follow-up Communications :23}   No follow-ups on file.

## 2021-08-20 ENCOUNTER — TELEPHONE (OUTPATIENT)
Dept: NEUROSURGERY | Facility: CLINIC | Age: 51
End: 2021-08-20

## 2021-08-20 PROBLEM — M48.062 SPINAL STENOSIS OF LUMBAR REGION WITH NEUROGENIC CLAUDICATION: Status: ACTIVE | Noted: 2021-08-20

## 2021-08-20 NOTE — TELEPHONE ENCOUNTER
Caller: Darrick Mooney    Relationship to patient: Self    Best call back number: [phone]    Chief complaint: RETURNING A CALL    Type of visit: SURGERY    Requested date: N/A    If rescheduling, when is the original appointment: N/A    Additional notes:PT CALLED BACK TO SCHEDULE HIS SURGERY. HE STATED BETWEEN 1PM AND 3PM TO PLEASE CALL [phone] AFTER 3 PM PLEASE CALL HIS CELL PHONE [phone]    ATTEMPTED TO WARM TRANSFER NO ANSWER.     THANK YOU

## 2021-09-09 DIAGNOSIS — M54.30 SCIATIC LEG PAIN: ICD-10-CM

## 2021-09-09 RX ORDER — TRAMADOL HYDROCHLORIDE 50 MG/1
50 TABLET ORAL DAILY
Qty: 30 TABLET | Refills: 1 | Status: SHIPPED | OUTPATIENT
Start: 2021-09-09 | End: 2021-09-10 | Stop reason: SDUPTHER

## 2021-09-09 NOTE — TELEPHONE ENCOUNTER
.      Caller: Darrick Mooney    Relationship: Self    Best call back number: [phone] (H)    Medication needed:   Requested Prescriptions     Pending Prescriptions Disp Refills   • traMADol (ULTRAM) 50 MG tablet 30 tablet 0     Sig: Take 1 tablet by mouth Daily.       When do you need the refill by: ASAP    What additional details did the patient provide when requesting the medication: PATIENT IS OUT OF MEDICATION     Does the patient have less than a 3 day supply:  [x] Yes  [] No    What is the patient's preferred pharmacy:  [facility] [address] [place] [id number] [phone]

## 2021-09-10 DIAGNOSIS — M54.30 SCIATIC LEG PAIN: ICD-10-CM

## 2021-09-10 RX ORDER — TRAMADOL HYDROCHLORIDE 50 MG/1
50 TABLET ORAL DAILY
Qty: 90 TABLET | Refills: 1 | Status: SHIPPED | OUTPATIENT
Start: 2021-09-10 | End: 2021-11-16 | Stop reason: SDUPTHER

## 2021-09-10 NOTE — TELEPHONE ENCOUNTER
Pts wife stated that his Tramadol script was needing to be for 90 days instead of 30 days. Said this was recently talked about at pts last appt.

## 2021-09-10 NOTE — TELEPHONE ENCOUNTER
There is no way I was going to remember that he wanted 90 from his last visit after a couple 100 patients.  He would have needed to remind us when he called in for a refill request.  They will need to return the old prescription before they will get the new prescription.

## 2021-10-12 DIAGNOSIS — E03.9 ACQUIRED HYPOTHYROIDISM: ICD-10-CM

## 2021-10-12 RX ORDER — LEVOTHYROXINE SODIUM 112 MCG
112 TABLET ORAL DAILY
Qty: 90 TABLET | Refills: 0 | Status: SHIPPED | OUTPATIENT
Start: 2021-10-12 | End: 2021-11-16 | Stop reason: SDUPTHER

## 2021-10-12 RX ORDER — LEVOTHYROXINE SODIUM 112 MCG
112 TABLET ORAL DAILY
Qty: 90 TABLET | Refills: 0 | Status: SHIPPED | OUTPATIENT
Start: 2021-10-12 | End: 2021-10-12 | Stop reason: SDUPTHER

## 2021-10-12 NOTE — TELEPHONE ENCOUNTER
Caller: SAHRA WEISS    Relationship: Emergency Contact      Medication requested (name and dosage):    Synthroid 112 MCG tablet       Pharmacy where request should be sent: [facility] [place] - [address] - [phone]  - [phone]   [phone]    Additional details provided by patient: PATIENT STATED IF NEEDED TO CALL PLEASE CALLED WIFE LISTED ABOVE, THANK YOU.     Best call back number: [phone]     Does the patient have less than a 3 day supply:  [] Yes  [x] No

## 2021-11-08 ENCOUNTER — TELEPHONE (OUTPATIENT)
Dept: FAMILY MEDICINE CLINIC | Facility: CLINIC | Age: 51
End: 2021-11-08

## 2021-11-08 NOTE — TELEPHONE ENCOUNTER
Caller: Darrick Mooney    Relationship: Self    Best call back number: [phone]    What was the call regarding: PT WOULD LIKE A LETTER STATING ALL HIS MEDS AND CARE ARE UP TO DATE AND HE IS HEALTHY FOR A DEPLOYMENT.  PLEASE CALL BACK AND ADVISE, THANK YOU.    Do you require a callback: YES         no weight-bearing restrictions

## 2021-11-16 DIAGNOSIS — E03.9 ACQUIRED HYPOTHYROIDISM: ICD-10-CM

## 2021-11-16 DIAGNOSIS — M54.30 SCIATIC LEG PAIN: ICD-10-CM

## 2021-11-16 RX ORDER — TRAMADOL HYDROCHLORIDE 50 MG/1
50 TABLET ORAL DAILY
Qty: 90 TABLET | Refills: 0 | Status: SHIPPED | OUTPATIENT
Start: 2021-11-16 | End: 2021-11-19 | Stop reason: SDUPTHER

## 2021-11-16 RX ORDER — LEVOTHYROXINE SODIUM 112 MCG
112 TABLET ORAL DAILY
Qty: 90 TABLET | Refills: 0 | Status: SHIPPED | OUTPATIENT
Start: 2021-11-16 | End: 2021-11-19 | Stop reason: SDUPTHER

## 2021-11-16 NOTE — TELEPHONE ENCOUNTER
Caller: Darrick Mooney    Relationship: Self    Best call back number:     Requested Prescriptions:   Requested Prescriptions     Pending Prescriptions Disp Refills   • traMADol (ULTRAM) 50 MG tablet 90 tablet 1     Sig: Take 1 tablet by mouth Daily.   • Synthroid 112 MCG tablet 90 tablet 0     Sig: Take 1 tablet by mouth Daily.    ATORVASTATIN, UNKNOWN DOSAGE    180 DAYS FOR EACH MEDICATION IF POSSIBLE     Pharmacy where request should be sent:    [facility]  [address] [place] [id number] [phone]    Does the patient have less than a 3 day supply:  [] Yes  [x] No    Mary Kay Ruiz Rep   11/16/21 11:59 EST

## 2021-11-19 DIAGNOSIS — E03.9 ACQUIRED HYPOTHYROIDISM: ICD-10-CM

## 2021-11-19 DIAGNOSIS — M54.30 SCIATIC LEG PAIN: ICD-10-CM

## 2021-11-19 RX ORDER — LEVOTHYROXINE SODIUM 112 MCG
112 TABLET ORAL DAILY
Qty: 180 TABLET | Refills: 0 | Status: SHIPPED | OUTPATIENT
Start: 2021-11-19 | End: 2022-08-22 | Stop reason: SDUPTHER

## 2021-11-19 RX ORDER — ATORVASTATIN CALCIUM 20 MG/1
20 TABLET, FILM COATED ORAL DAILY
COMMUNITY
End: 2021-11-19 | Stop reason: SDUPTHER

## 2021-11-19 RX ORDER — TRAMADOL HYDROCHLORIDE 50 MG/1
50 TABLET ORAL DAILY
Qty: 180 TABLET | Refills: 1 | Status: SHIPPED | OUTPATIENT
Start: 2021-11-19 | End: 2022-05-18

## 2021-11-19 RX ORDER — ATORVASTATIN CALCIUM 20 MG/1
20 TABLET, FILM COATED ORAL DAILY
Qty: 180 TABLET | Refills: 0 | Status: SHIPPED | OUTPATIENT
Start: 2021-11-19 | End: 2022-04-11

## 2021-11-19 NOTE — TELEPHONE ENCOUNTER
Patient's wife came in and called the patient so I could talk to him. 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 I told them I wasn't sure if we could do that and we never have before but I would sent you a refill request and let you decide. All meds are pending     Also he states he has some labs done for a physical before he leave and his LDL was high, wants to start back on the atorvastatin.

## 2021-12-02 ENCOUNTER — OFFICE VISIT (OUTPATIENT)
Dept: FAMILY MEDICINE CLINIC | Facility: CLINIC | Age: 51
End: 2021-12-02

## 2021-12-02 VITALS
TEMPERATURE: 97.9 F | WEIGHT: 196.6 LBS | DIASTOLIC BLOOD PRESSURE: 92 MMHG | OXYGEN SATURATION: 100 % | HEIGHT: 69 IN | SYSTOLIC BLOOD PRESSURE: 151 MMHG | BODY MASS INDEX: 29.12 KG/M2 | HEART RATE: 62 BPM

## 2021-12-02 DIAGNOSIS — Z90.49 STATUS POST LAPAROSCOPIC APPENDECTOMY: Primary | ICD-10-CM

## 2021-12-02 PROCEDURE — 99213 OFFICE O/P EST LOW 20 MIN: CPT | Performed by: NURSE PRACTITIONER

## 2021-12-02 RX ORDER — MELATONIN
1000 DAILY
COMMUNITY

## 2021-12-02 NOTE — PROGRESS NOTES
"Chief Complaint  Follow-up (Appendectomy follow up on 11/27/21 in [place]; Pt is doing well) and Muscle Pain    Subjective          Darrick Mooney presents to [facility]  History of Present Illness     Pt reports bowel are moving well. Pt had BM this morning. Pt is 5 days post op. Pt was given tylenol and ibuprofen. Pt stopped taking meds d/t upset stomach.     Surgical incisions are healing well.     Pt was back to work on Monday.     Objective   Vital Signs:   /92 (BP Location: Left arm, Patient Position: Sitting, Cuff Size: Adult)   Pulse 62   Temp 97.9 °F (36.6 °C) (Temporal)   Ht 175.3 cm (69.02\")   Wt 89.2 kg (196 lb 9.6 oz)   SpO2 100%   BMI 29.02 kg/m²     Physical Exam  Constitutional:       Appearance: Normal appearance.   Cardiovascular:      Rate and Rhythm: Normal rate and regular rhythm.      Heart sounds: Normal heart sounds.   Pulmonary:      Effort: Pulmonary effort is normal.      Breath sounds: Normal breath sounds.   Abdominal:      General: A surgical scar is present. Bowel sounds are normal.      Palpations: There is no mass.      Hernia: No hernia is present.          Comments: 3 surgical incisions noted per diagram above.  All well approximated without erythema or drainage.   Skin:     General: Skin is warm and dry.   Neurological:      Mental Status: He is alert.        Result Review :         Assessment and Plan    Diagnoses and all orders for this visit:    1. Status post laparoscopic appendectomy (Primary)    Recommend patient to continue with no lifting greater than 10 pounds or repetitive motions.  Patient advised risk of causing herniations of incisions if he over stresses the areas.  Patient verbalized understanding.    Follow Up   Return if symptoms worsen or fail to improve.     Patient was given instructions and counseling regarding his condition or for health maintenance advice. Please see specific information pulled into " the AVS if appropriate.     JOSE Contreras

## 2021-12-02 NOTE — PATIENT INSTRUCTIONS
Laparoscopic Appendectomy, Adult, Care After  This sheet gives you information about how to care for yourself after your procedure. Your doctor may also give you more specific instructions. If you have problems or questions, contact your doctor.  What can I expect after the procedure?  After the procedure, it is common to have:  · Little energy for normal activities.  · Mild pain in the area where the cuts from surgery (incisions) were made.  · Trouble pooping (constipation). This can be caused by:  ? Pain medicine.  ? A lack of activity.  Follow these instructions at home:  Medicines  · Take over-the-counter and prescription medicines only as told by your doctor.  · If you were prescribed an antibiotic medicine, take it as told by your doctor. Do not stop taking it even if you start to feel better.  · Do not drive or use heavy machinery while taking prescription pain medicine.  · Ask your doctor if the medicine you are taking can cause trouble pooping. You may need to take steps to prevent or treat trouble pooping:  ? Drink enough fluid to keep your pee (urine) pale yellow.  ? Take over-the-counter or prescription medicines.  ? Eat foods that are high in fiber. These include beans, whole grains, and fresh fruits and vegetables.  ? Limit foods that are high in fat and sugar. These include fried or sweet foods.  Incision care    · Follow instructions from your doctor about how to take care of your cuts from surgery. Make sure you:  ? Wash your hands with soap and water before and after you change your bandage (dressing). If you cannot use soap and water, use hand .  ? Change your bandage as told by your doctor.  ? Leave stitches (sutures), skin glue, or skin tape (adhesive) strips in place. They may need to stay in place for 2 weeks or longer. If tape strips get loose and curl up, you may trim the loose edges. Do not remove tape strips completely unless your doctor says it is okay.  · Check your cuts from  surgery every day for signs of infection. Check for:  ? Redness, swelling, or pain.  ? Fluid or blood.  ? Warmth.  ? Pus or a bad smell.    Bathing  · Keep your cuts from surgery clean and dry. Clean them as told by your doctor. To do this:  1. Gently wash the cuts with soap and water.  2. Rinse the cuts with water to remove all soap.  3. Pat the cuts dry with a clean towel. Do not rub the cuts.  · Do not take baths, swim, or use a hot tub for 2 weeks, or until your doctor says it is okay. You may take showers after 48 hours.  Activity    · Do not drive for 24 hours if you were given a medicine to help you relax (sedative) during your procedure.  · Rest after the procedure. Return to your normal activities as told by your doctor. Ask your doctor what activities are safe for you.  · For 3 weeks, or for as long as told by your doctor:  ? Do not lift anything that is heavier than 10 lb (4.5 kg), or the limit that you are told.  ? Do not play contact sports.    General instructions  · If you were sent home with a drain, follow instructions from your doctor on how to care for it.  · Take deep breaths. This helps to keep your lungs from getting an infection (pneumonia).  · Keep all follow-up visits as told by your doctor. This is important.  Contact a doctor if:  · You have redness, swelling, or pain around a cut from surgery.  · You have fluid or blood coming from a cut.  · Your cut feels warm to the touch.  · You have pus or a bad smell coming from a cut or a bandage.  · The edges of a cut break open after the stitches have been taken out.  · You have pain in your shoulders that gets worse.  · You feel dizzy or you pass out (faint).  · You have shortness of breath.  · You keep feeling sick to your stomach (nauseous).  · You keep throwing up (vomiting).  · You get watery poop (diarrhea) or you cannot control your poop.  · You lose your appetite.  · You have swelling or pain in your legs.  · You get a rash.  Get help  right away if:  · You have a fever.  · You have trouble breathing.  · You have sharp pains in your chest.  Summary  · After the procedure, it is common to have low energy, mild pain, and trouble pooping.  · Infection is a common problem after this procedure. Follow your doctor's instructions about caring for yourself after the procedure.  · Rest after the procedure. Return to your normal activities as told by your doctor.  · Contact your doctor if you see signs of infection around your cuts from surgery, or you get short of breath. Get help right away if you have a fever, chest pain, or trouble breathing.  This information is not intended to replace advice given to you by your health care provider. Make sure you discuss any questions you have with your health care provider.  Document Revised: 06/20/2019 Document Reviewed: 06/20/2019  Elsevier Patient Education © 2021 [employer]

## 2021-12-08 ENCOUNTER — OFFICE VISIT (OUTPATIENT)
Dept: FAMILY MEDICINE CLINIC | Facility: CLINIC | Age: 51
End: 2021-12-08

## 2021-12-08 VITALS
OXYGEN SATURATION: 99 % | SYSTOLIC BLOOD PRESSURE: 123 MMHG | HEART RATE: 60 BPM | BODY MASS INDEX: 28.53 KG/M2 | WEIGHT: 192.6 LBS | TEMPERATURE: 97.7 F | HEIGHT: 69 IN | DIASTOLIC BLOOD PRESSURE: 92 MMHG

## 2021-12-08 DIAGNOSIS — E78.5 HYPERLIPIDEMIA, UNSPECIFIED HYPERLIPIDEMIA TYPE: Primary | ICD-10-CM

## 2021-12-08 DIAGNOSIS — M48.062 SPINAL STENOSIS OF LUMBAR REGION WITH NEUROGENIC CLAUDICATION: ICD-10-CM

## 2021-12-08 DIAGNOSIS — E03.9 ACQUIRED HYPOTHYROIDISM: ICD-10-CM

## 2021-12-08 LAB
ALBUMIN SERPL-MCNC: 4.7 G/DL (ref 3.5–5.2)
ALBUMIN/GLOB SERPL: 1.6 G/DL
ALP SERPL-CCNC: 88 U/L (ref 39–117)
ALT SERPL W P-5'-P-CCNC: 45 U/L (ref 1–41)
ANION GAP SERPL CALCULATED.3IONS-SCNC: 13.5 MMOL/L (ref 5–15)
AST SERPL-CCNC: 32 U/L (ref 1–40)
BILIRUB SERPL-MCNC: 0.5 MG/DL (ref 0–1.2)
BUN SERPL-MCNC: 13 MG/DL (ref 6–20)
BUN/CREAT SERPL: 11.4 (ref 7–25)
CALCIUM SPEC-SCNC: 10.6 MG/DL (ref 8.6–10.5)
CHLORIDE SERPL-SCNC: 103 MMOL/L (ref 98–107)
CHOLEST SERPL-MCNC: 170 MG/DL (ref 0–200)
CO2 SERPL-SCNC: 26.5 MMOL/L (ref 22–29)
CREAT SERPL-MCNC: 1.14 MG/DL (ref 0.76–1.27)
GFR SERPL CREATININE-BSD FRML MDRD: 68 ML/MIN/1.73
GLOBULIN UR ELPH-MCNC: 2.9 GM/DL
GLUCOSE SERPL-MCNC: 97 MG/DL (ref 65–99)
HDLC SERPL-MCNC: 43 MG/DL (ref 40–60)
LDLC SERPL CALC-MCNC: 101 MG/DL (ref 0–100)
LDLC/HDLC SERPL: 2.26 {RATIO}
POTASSIUM SERPL-SCNC: 4.3 MMOL/L (ref 3.5–5.2)
PROT SERPL-MCNC: 7.6 G/DL (ref 6–8.5)
SODIUM SERPL-SCNC: 143 MMOL/L (ref 136–145)
T4 FREE SERPL-MCNC: 1.49 NG/DL (ref 0.93–1.7)
TRIGL SERPL-MCNC: 150 MG/DL (ref 0–150)
TSH SERPL DL<=0.05 MIU/L-ACNC: 5.26 UIU/ML (ref 0.27–4.2)
VLDLC SERPL-MCNC: 26 MG/DL (ref 5–40)

## 2021-12-08 PROCEDURE — 84439 ASSAY OF FREE THYROXINE: CPT | Performed by: FAMILY MEDICINE

## 2021-12-08 PROCEDURE — 80053 COMPREHEN METABOLIC PANEL: CPT | Performed by: FAMILY MEDICINE

## 2021-12-08 PROCEDURE — 84443 ASSAY THYROID STIM HORMONE: CPT | Performed by: FAMILY MEDICINE

## 2021-12-08 PROCEDURE — 99214 OFFICE O/P EST MOD 30 MIN: CPT | Performed by: FAMILY MEDICINE

## 2021-12-08 PROCEDURE — 80061 LIPID PANEL: CPT | Performed by: FAMILY MEDICINE

## 2021-12-08 NOTE — ASSESSMENT & PLAN NOTE
His use of his tramadol is infrequent and only on days that he runs, which is typically only about 3 days/week.  His 30-day prescription last him about 60 days.  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  Thus her last prescription I recently wrote for him he was brought back to the office to be destroyed.

## 2021-12-08 NOTE — PROGRESS NOTES
Chief Complaint   Patient presents with   • 6 month follow up     no concerns at this time   • Letter     stating Tramadol prescription has         Subjective     Darrick Mooney  has a past medical history of Labral tear of hip joint, Migraine headache, RLS (restless legs syndrome), Sciatica, Smokeless tobacco use (10/10/2019), and Spinal stenosis at L4-L5 level (CURRENTLY).    Hyperlipidemia-he states he is taking his cholesterol medicine on a daily basis.    Hypothyroid-he takes his levothyroxine every morning.    Degenerative disc disease-this causes some intermittent low back pain.  It is worse when he runs.  He will take some tramadol on an intermittent basis and typically is only on these days.  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      PHQ-2 Depression Screening  Little interest or pleasure in doing things?     Feeling down, depressed, or hopeless?     PHQ-2 Total Score     PHQ-9 Depression Screening  Little interest or pleasure in doing things?     Feeling down, depressed, or hopeless?     Trouble falling or staying asleep, or sleeping too much?     Feeling tired or having little energy?     Poor appetite or overeating?     Feeling bad about yourself - or that you are a failure or have let yourself or your family down?     Trouble concentrating on things, such as reading the newspaper or watching television?     Moving or speaking so slowly that other people could have noticed? Or the opposite - being so fidgety or restless that you have been moving around a lot more than usual?     Thoughts that you would be better off dead, or of hurting yourself in some way?     PHQ-9 Total Score     If you checked off any problems, how difficult have these problems made it for you to do your work, take care of things at home, or get along with other people?       No Known Allergies    Prior to Admission medications    Medication Sig Start Date End Date  Taking? Authorizing Provider   atorvastatin (LIPITOR) 20 MG tablet Take 1 tablet by mouth Daily. 11/19/21  Yes Wood Martines DO   cholecalciferol (VITAMIN D3) 25 MCG (1000 UT) tablet Take 1,000 Units by mouth Daily.   Yes ProviderAugie MD   Multiple Vitamins-Minerals (Multivitamin Adult, Minerals,) tablet Take 1 tablet by mouth Daily.   Yes Augie Rivera MD   Synthroid 112 MCG tablet Take 1 tablet by mouth Daily. 11/19/21  Yes Wood Martines DO   vitamin B-12 (CYANOCOBALAMIN) 500 MCG tablet Take 500 mcg by mouth Daily.   Yes ProviderAugie MD   traMADol (ULTRAM) 50 MG tablet Take 1 tablet by mouth Daily for 180 days. 11/19/21 5/18/22  Wood Martines DO        Patient Active Problem List   Diagnosis   • Hyperlipidemia   • RLS (restless legs syndrome)   • Acquired hypothyroidism   • Sciatic leg pain   • Benign prostatic hyperplasia without lower urinary tract symptoms   • Smokeless tobacco use   • Migraine headache   • Labral tear of hip joint   • Spinal stenosis of lumbar region with neurogenic claudication        Past Surgical History:   Procedure Laterality Date   • APPENDECTOMY     • COLONOSCOPY  06/05/2011   • ELBOW ARTHROPLASTY Left 2014   • NOSE SURGERY  2002    deviated septum       Social History     Socioeconomic History   • Marital status:    Tobacco Use   • Smoking status: Never Smoker   • Smokeless tobacco: Current User     Types: Chew   • Tobacco comment: 1 can every 2 days   Vaping Use   • Vaping Use: Never used   Substance and Sexual Activity   • Alcohol use: Yes     Comment: occaionally    • Drug use: Never   • Sexual activity: Defer       Family History   Problem Relation Age of Onset   • Heart disease Mother    • Heart disease Maternal Grandmother    • Diabetes Maternal Grandmother         mellitus   • Hypertension Maternal Grandmother    • Cancer Paternal Grandmother    • Cancer Other    • Heart disease Other    • Heart disease Other   "      Family history, surgical history, past medical history, Allergies and med's reviewed with patient today and updated in [facility] EMR.     ROS:  Review of Systems   Constitutional: Negative for fatigue.   HENT: Positive for rhinorrhea. Negative for congestion and postnasal drip.    Eyes: Negative for blurred vision and visual disturbance.   Respiratory: Negative for cough, chest tightness, shortness of breath and wheezing.    Cardiovascular: Negative for chest pain and palpitations.   Gastrointestinal: Negative for abdominal pain, constipation and diarrhea.   Musculoskeletal: Positive for back pain.   Neurological: Positive for headache.       OBJECTIVE:  Vitals:    12/08/21 0747   BP: 123/92   BP Location: Left arm   Patient Position: Sitting   Cuff Size: Adult   Pulse: 60   Temp: 97.7 °F (36.5 °C)   TempSrc: Temporal   SpO2: 99%   Weight: 87.4 kg (192 lb 9.6 oz)   Height: 175.3 cm (69.02\")     No exam data present   Body mass index is 28.43 kg/m².  No LMP for male patient.    Physical Exam  Vitals and nursing note reviewed.   Constitutional:       General: He is not in acute distress.     Appearance: Normal appearance. He is normal weight.   HENT:      Head: Normocephalic.      Right Ear: Tympanic membrane, ear canal and external ear normal.      Left Ear: Tympanic membrane, ear canal and external ear normal.      Nose: Nose normal.      Mouth/Throat:      Mouth: Mucous membranes are moist.      Pharynx: Oropharynx is clear.   Eyes:      General: No scleral icterus.     Conjunctiva/sclera: Conjunctivae normal.      Pupils: Pupils are equal, round, and reactive to light.   Cardiovascular:      Rate and Rhythm: Normal rate and regular rhythm.      Pulses: Normal pulses.      Heart sounds: Normal heart sounds. No murmur heard.      Pulmonary:      Effort: Pulmonary effort is normal.      Breath sounds: Normal breath sounds. No wheezing, rhonchi or rales.   Musculoskeletal:      Cervical back: No rigidity or " tenderness.   Lymphadenopathy:      Cervical: No cervical adenopathy.   Skin:     General: Skin is warm and dry.      Coloration: Skin is not jaundiced.      Findings: No rash.   Neurological:      General: No focal deficit present.      Mental Status: He is alert and oriented to person, place, and time.      Gait: Gait normal.   Psychiatric:         Mood and Affect: Mood normal.         Thought Content: Thought content normal.         Judgment: Judgment normal.         Procedures    No visits with results within 30 Day(s) from this visit.   Latest known visit with results is:   Office Visit on 06/07/2021   Component Date Value Ref Range Status   • Total Cholesterol 06/07/2021 207* 0 - 200 mg/dL Final   • Triglycerides 06/07/2021 204* 0 - 150 mg/dL Final   • HDL Cholesterol 06/07/2021 36* 40 - 60 mg/dL Final   • LDL Cholesterol  06/07/2021 134* 0 - 100 mg/dL Final   • VLDL Cholesterol 06/07/2021 37  5 - 40 mg/dL Final   • LDL/HDL Ratio 06/07/2021 3.62   Final   • TSH 06/07/2021 2.660  0.270 - 4.200 uIU/mL Final   • Free T4 06/07/2021 1.36  0.93 - 1.70 ng/dL Final    T4 results may be falsely increased if patient taking Biotin.       ASSESSMENT/ PLAN:    Diagnoses and all orders for this visit:    1. Hyperlipidemia, unspecified hyperlipidemia type (Primary)  Assessment & Plan:  We will update his lipid profile while he is here today.    Orders:  -     Comprehensive Metabolic Panel  -     Lipid Panel  -     TSH+Free T4    2. Acquired hypothyroidism  Assessment & Plan:  Update his thyroid profile with his labs today.    Orders:  -     TSH+Free T4    3. Spinal stenosis of lumbar region with neurogenic claudication  Assessment & Plan:  His use of his tramadol is infrequent and only on days that he runs, which is typically only about 3 days/week.  His 30-day prescription last him about 60 days.  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  Thus her last prescription I recently wrote for him he was brought back to the office to be destroyed.        Orders Placed Today:     No orders of the defined types were placed in this encounter.       Management Plan:     An After Visit Summary was printed and given to the patient at discharge.    Follow-up: Return in about 6 months (around 6/8/2022) for Recheck.    Wood Martines,  12/8/2021 08:06 EST  This note was electronically signed.

## 2021-12-09 ENCOUNTER — TELEPHONE (OUTPATIENT)
Dept: FAMILY MEDICINE CLINIC | Facility: CLINIC | Age: 51
End: 2021-12-09

## 2021-12-09 NOTE — TELEPHONE ENCOUNTER
[employer] message sent    ----- Message from Wood Martines, DO sent at 12/9/2021  7:34 AM EST -----  His thyroid profile shows a mildly elevated TSH but a normal T4.  Would not make any changes at this time other than to repeat this in 6 months.  His CMP and lipid profile that were within normal limits.

## 2022-02-09 ENCOUNTER — TELEPHONE (OUTPATIENT)
Dept: FAMILY MEDICINE CLINIC | Facility: CLINIC | Age: 52
End: 2022-02-09

## 2022-02-09 NOTE — TELEPHONE ENCOUNTER
Caller: [facility]    Best call back number: [phone]    What specialty or service is being requested: LAPROSCOPIC CHOLECYSTECTOMY [facility] [address] 11/27/21    NPI [id number]  TAX ID [id number]    What is the provider, practice or medical service name: [facility]    What is the office location: [address] [place] [address]    What is the office phone number: [phone]    Any additional details: YAJAIRA FROM [facility] CALLED AND STATED THAT INSURANCE WILL NOT COVER THE PROCEDURE WITHOUT A REFERRAL FROM HIS PROVIDER. SHE EXPLAINED TO HIM THAT DR LAW IS NOT CONNECTED TO THE PROCEDURE BECAUSE HE NEVER PUT IN A REFERRAL. SHE STATED THAT THE PATIENT WAS ADAMANT ON DR LAW PUT IN A REFERRAL. SHE WOULD LIKE A CALL FROM THE OFFICE TO DISCUSS DETAILS.

## 2022-04-11 ENCOUNTER — OFFICE VISIT (OUTPATIENT)
Dept: FAMILY MEDICINE CLINIC | Facility: CLINIC | Age: 52
End: 2022-04-11

## 2022-04-11 VITALS
WEIGHT: 195 LBS | HEIGHT: 68 IN | SYSTOLIC BLOOD PRESSURE: 128 MMHG | TEMPERATURE: 97.9 F | DIASTOLIC BLOOD PRESSURE: 87 MMHG | HEART RATE: 57 BPM | BODY MASS INDEX: 29.55 KG/M2 | OXYGEN SATURATION: 98 %

## 2022-04-11 DIAGNOSIS — I21.4 NSTEMI (NON-ST ELEVATED MYOCARDIAL INFARCTION): Primary | ICD-10-CM

## 2022-04-11 DIAGNOSIS — E03.9 ACQUIRED HYPOTHYROIDISM: ICD-10-CM

## 2022-04-11 DIAGNOSIS — E78.5 HYPERLIPIDEMIA, UNSPECIFIED HYPERLIPIDEMIA TYPE: ICD-10-CM

## 2022-04-11 PROCEDURE — 99495 TRANSJ CARE MGMT MOD F2F 14D: CPT | Performed by: FAMILY MEDICINE

## 2022-04-11 RX ORDER — ATORVASTATIN CALCIUM 20 MG/1
80 TABLET, FILM COATED ORAL DAILY
Qty: 1 TABLET | Refills: 0
Start: 2022-04-11 | End: 2022-05-27 | Stop reason: SDUPTHER

## 2022-04-11 NOTE — ASSESSMENT & PLAN NOTE
He has done well since his initial acute event.  He needs to get set up with a cardiologist locally to continue to be evaluated.

## 2022-04-11 NOTE — ASSESSMENT & PLAN NOTE
Prior to his event.  His LDL was only 101.  Since this event they increased his atorvastatin to 80 mg daily and added Praluent.  When he been on this now about 2 weeks.  We will wait another couple weeks before repeating his labs.

## 2022-04-11 NOTE — PROGRESS NOTES
Chief Complaint   Patient presents with   • Hospital Follow Up Visit     Heart Attack        Subjective     Darrick Mooney  has a past medical history of Labral tear of hip joint, Migraine headache, Myocardial infarction (HCC), RLS (restless legs syndrome), Sciatica, Smokeless tobacco use (10/10/2019), and Spinal stenosis at L4-L5 level (CURRENTLY).    Hospital [id number] was admitted 3/21 through 3/24/2022.  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  He noticed that he was starting to have some left anterior chest burning.  Just typically was more associated with physical activity.  Initially he medicated as if he was having reflux without any benefit.  He eventually decided to be evaluated and was seen at a local [facility] facility.  There he had a heart catheterization which did show some coronary disease.  He did have 1 stent placed and angioplastied another lesion.  He has not had any further chest burning since having this procedure.  The only activity he has done since has been walking.      PHQ-2 Depression Screening  Little interest or pleasure in doing things?     Feeling down, depressed, or hopeless?     PHQ-2 Total Score     PHQ-9 Depression Screening  Little interest or pleasure in doing things?     Feeling down, depressed, or hopeless?     Trouble falling or staying asleep, or sleeping too much?     Feeling tired or having little energy?     Poor appetite or overeating?     Feeling bad about yourself - or that you are a failure or have let yourself or your family down?     Trouble concentrating on things, such as reading the newspaper or watching television?     Moving or speaking so slowly that other people could have noticed? Or the opposite - being so fidgety or restless that you have been moving around a lot more than usual?     Thoughts that you would be better off dead, or of hurting yourself in some way?     PHQ-9 Total Score     If you  checked off any problems, how difficult have these problems made it for you to do your work, take care of things at home, or get along with other people?       No Known Allergies    Prior to Admission medications    Medication Sig Start Date End Date Taking? Authorizing Provider   atorvastatin (LIPITOR) 20 MG tablet Take 1 tablet by mouth Daily.  Patient taking differently: Take 80 mg by mouth Daily. 11/19/21  Yes Wood Martines DO   cholecalciferol (VITAMIN D3) 25 MCG (1000 UT) tablet Take 1,000 Units by mouth Daily.   Yes Augie Rivera MD   Multiple Vitamins-Minerals (Multivitamin Adult, Minerals,) tablet Take 1 tablet by mouth Daily.   Yes Augie Rivera MD   Synthroid 112 MCG tablet Take 1 tablet by mouth Daily. 11/19/21  Yes Wood Martines DO   vitamin B-12 (CYANOCOBALAMIN) 500 MCG tablet Take 500 mcg by mouth Daily.   Yes Augie Rivera MD   traMADol (ULTRAM) 50 MG tablet Take 1 tablet by mouth Daily for 180 days. 11/19/21 5/18/22  Wood Martines DO        Patient Active Problem List   Diagnosis   • Hyperlipidemia   • RLS (restless legs syndrome)   • Acquired hypothyroidism   • Sciatic leg pain   • Benign prostatic hyperplasia without lower urinary tract symptoms   • Smokeless tobacco use   • Migraine headache   • Labral tear of hip joint   • Spinal stenosis of lumbar region with neurogenic claudication   • NSTEMI (non-ST elevated myocardial infarction) (HCC)        Past Surgical History:   Procedure Laterality Date   • APPENDECTOMY     • COLONOSCOPY  06/05/2011   • ELBOW ARTHROPLASTY Left 2014   • NOSE SURGERY  2002    deviated septum       Social History     Socioeconomic History   • Marital status:    Tobacco Use   • Smoking status: Never Smoker   • Smokeless tobacco: Current User     Types: Chew   • Tobacco comment: 1 can every 2 days   Vaping Use   • Vaping Use: Never used   Substance and Sexual Activity   • Alcohol use: Yes     Comment:  "occaionally    • Drug use: Never   • Sexual activity: Defer       Family History   Problem Relation Age of Onset   • Heart disease Mother    • Heart disease Maternal Grandmother    • Diabetes Maternal Grandmother         mellitus   • Hypertension Maternal Grandmother    • Cancer Paternal Grandmother    • Cancer Other    • Heart disease Other    • Heart disease Other        Family history, surgical history, past medical history, Allergies and med's reviewed with patient today and updated in [facility] EMR.     ROS:  Review of Systems   Constitutional: Negative for fatigue.   HENT: Positive for congestion and rhinorrhea. Negative for postnasal drip.    Eyes: Negative for blurred vision and visual disturbance.   Respiratory: Negative for cough, chest tightness, shortness of breath and wheezing.    Cardiovascular: Negative for chest pain and palpitations.   Neurological: Negative for headache.   Psychiatric/Behavioral: Negative for depressed mood. The patient is not nervous/anxious.        OBJECTIVE:  Vitals:    04/11/22 0913   BP: 128/87   Pulse: 57   Temp: 97.9 °F (36.6 °C)   TempSrc: Temporal   SpO2: 98%   Weight: 88.5 kg (195 lb)   Height: 172.7 cm (68\")     No exam data present   Body mass index is 29.65 kg/m².  No LMP for male patient.    Physical Exam  Vitals and nursing note reviewed.   Constitutional:       General: He is not in acute distress.     Appearance: Normal appearance. He is normal weight.   HENT:      Head: Normocephalic.      Right Ear: Tympanic membrane, ear canal and external ear normal.      Left Ear: Tympanic membrane, ear canal and external ear normal.      Nose: Nose normal.      Mouth/Throat:      Mouth: Mucous membranes are moist.      Pharynx: Oropharynx is clear.   Eyes:      General: No scleral icterus.     Conjunctiva/sclera: Conjunctivae normal.      Pupils: Pupils are equal, round, and reactive to light.   Cardiovascular:      Rate and Rhythm: Normal rate and regular rhythm.      Pulses: " Normal pulses.      Heart sounds: Normal heart sounds. No murmur heard.  Pulmonary:      Effort: Pulmonary effort is normal.      Breath sounds: Normal breath sounds. No wheezing, rhonchi or rales.   Musculoskeletal:      Cervical back: No rigidity or tenderness.   Lymphadenopathy:      Cervical: No cervical adenopathy.   Skin:     General: Skin is warm and dry.      Coloration: Skin is not jaundiced.      Findings: No rash.   Neurological:      General: No focal deficit present.      Mental Status: He is alert and oriented to person, place, and time.   Psychiatric:         Mood and Affect: Mood normal.         Thought Content: Thought content normal.         Judgment: Judgment normal.         Procedures    No visits with results within 30 Day(s) from this visit.   Latest known visit with results is:   Office Visit on 12/08/2021   Component Date Value Ref Range Status   • Glucose 12/08/2021 97  65 - 99 mg/dL Final   • BUN 12/08/2021 13  6 - 20 mg/dL Final   • Creatinine 12/08/2021 1.14  0.76 - 1.27 mg/dL Final   • Sodium 12/08/2021 143  136 - 145 mmol/L Final   • Potassium 12/08/2021 4.3  3.5 - 5.2 mmol/L Final   • Chloride 12/08/2021 103  98 - 107 mmol/L Final   • CO2 12/08/2021 26.5  22.0 - 29.0 mmol/L Final   • Calcium 12/08/2021 10.6 (A) 8.6 - 10.5 mg/dL Final   • Total Protein 12/08/2021 7.6  6.0 - 8.5 g/dL Final   • Albumin 12/08/2021 4.70  3.50 - 5.20 g/dL Final   • ALT (SGPT) 12/08/2021 45 (A) 1 - 41 U/L Final   • AST (SGOT) 12/08/2021 32  1 - 40 U/L Final   • Alkaline Phosphatase 12/08/2021 88  39 - 117 U/L Final   • Total Bilirubin 12/08/2021 0.5  0.0 - 1.2 mg/dL Final   • eGFR Non  Amer 12/08/2021 68  >60 mL/min/1.73 Final   • Globulin 12/08/2021 2.9  gm/dL Final   • A/G Ratio 12/08/2021 1.6  g/dL Final   • BUN/Creatinine Ratio 12/08/2021 11.4  7.0 - 25.0 Final   • Anion Gap 12/08/2021 13.5  5.0 - 15.0 mmol/L Final   • Total Cholesterol 12/08/2021 170  0 - 200 mg/dL Final   • Triglycerides  12/08/2021 150  0 - 150 mg/dL Final   • HDL Cholesterol 12/08/2021 43  40 - 60 mg/dL Final   • LDL Cholesterol  12/08/2021 101 (A) 0 - 100 mg/dL Final   • VLDL Cholesterol 12/08/2021 26  5 - 40 mg/dL Final   • LDL/HDL Ratio 12/08/2021 2.26   Final   • TSH 12/08/2021 5.260 (A) 0.270 - 4.200 uIU/mL Final   • Free T4 12/08/2021 1.49  0.93 - 1.70 ng/dL Final    T4 results may be falsely increased if patient taking Biotin.       ASSESSMENT/ PLAN:    Diagnoses and all orders for this visit:    1. NSTEMI (non-ST elevated myocardial infarction) (HCC) (Primary)  Assessment & Plan:  He has done well since his initial acute event.  He needs to get set up with a cardiologist locally to continue to be evaluated.    Orders:  -     Lipid Panel; Future  -     Ambulatory Referral to Cardiology    2. Hyperlipidemia, unspecified hyperlipidemia type  Assessment & Plan:  Prior to his event.  His LDL was only 101.  Since this event they increased his atorvastatin to 80 mg daily and added Praluent.  When he been on this now about 2 weeks.  We will wait another couple weeks before repeating his labs.    Orders:  -     Comprehensive Metabolic Panel; Future  -     Lipid Panel; Future    3. Acquired hypothyroidism  -     TSH+Free T4; Future    Other orders  -     atorvastatin (LIPITOR) 20 MG tablet; Take 4 tablets by mouth Daily.  Dispense: 1 tablet; Refill: 0  -     ticagrelor (BRILINTA) 90 MG tablet tablet; Take 1 tablet by mouth 2 (Two) Times a Day for 90 days.  Dispense: 180 tablet; Refill: 1  -     Alirocumab (PRALUENT) 150 MG/ML injection pen; Inject 1 mL under the skin into the appropriate area as directed Every 14 (Fourteen) Days for 90 days.  Dispense: 6 mL; Refill: 3      Orders Placed Today:     New Medications Ordered This Visit   Medications   • atorvastatin (LIPITOR) 20 MG tablet     Sig: Take 4 tablets by mouth Daily.     Dispense:  1 tablet     Refill:  0   • ticagrelor (BRILINTA) 90 MG tablet tablet     Sig: Take 1 tablet by  mouth 2 (Two) Times a Day for 90 days.     Dispense:  180 tablet     Refill:  1   • Alirocumab (PRALUENT) 150 MG/ML injection pen     Sig: Inject 1 mL under the skin into the appropriate area as directed Every 14 (Fourteen) Days for 90 days.     Dispense:  6 mL     Refill:  3        Management Plan:     An After Visit Summary was printed and given to the patient at discharge.    Follow-up: Return in about 3 months (around 7/11/2022).    Wood Martines DO 4/11/2022 09:45 EDT  This note was electronically signed.

## 2022-04-12 ENCOUNTER — TELEPHONE (OUTPATIENT)
Dept: FAMILY MEDICINE CLINIC | Facility: CLINIC | Age: 52
End: 2022-04-12

## 2022-04-27 ENCOUNTER — TELEPHONE (OUTPATIENT)
Dept: FAMILY MEDICINE CLINIC | Facility: CLINIC | Age: 52
End: 2022-04-27

## 2022-04-27 DIAGNOSIS — Z00.00 ROUTINE CHECK-UP: Primary | ICD-10-CM

## 2022-04-27 NOTE — TELEPHONE ENCOUNTER
Patient called and stated that he is due for his 10 year check up with GI and requested a referral. Okay to place?

## 2022-05-03 ENCOUNTER — TELEPHONE (OUTPATIENT)
Dept: FAMILY MEDICINE CLINIC | Facility: CLINIC | Age: 52
End: 2022-05-03

## 2022-05-03 NOTE — TELEPHONE ENCOUNTER
He needs to check and see if this is available at [employer].  He probably will also require a prior authorization.

## 2022-05-03 NOTE — TELEPHONE ENCOUNTER
Pt was prescribed Parluent and coverage was denied. S/w  to see other medication options would be Repatha.

## 2022-05-16 ENCOUNTER — TELEPHONE (OUTPATIENT)
Dept: FAMILY MEDICINE CLINIC | Facility: CLINIC | Age: 52
End: 2022-05-16

## 2022-05-16 NOTE — TELEPHONE ENCOUNTER
Caller: Darrick Mooney    Relationship: Self    Best call back number: [phone]    What is the best time to reach you: ANY    Who are you requesting to speak with (clinical staff, provider,  specific staff member): CLINICAL    What was the call regarding: PATIENT WOULD LIKE AN UPDATE ON HIS AUTHORIZAITON FOR HIS MEDICATION Evolocumab (REPATHA) solution auto-injector SureClick injection    Do you require a callback: YES

## 2022-05-20 ENCOUNTER — LAB (OUTPATIENT)
Dept: LAB | Facility: HOSPITAL | Age: 52
End: 2022-05-20

## 2022-05-20 DIAGNOSIS — E78.5 HYPERLIPIDEMIA, UNSPECIFIED HYPERLIPIDEMIA TYPE: ICD-10-CM

## 2022-05-20 DIAGNOSIS — I21.4 NSTEMI (NON-ST ELEVATED MYOCARDIAL INFARCTION): ICD-10-CM

## 2022-05-20 DIAGNOSIS — E03.9 ACQUIRED HYPOTHYROIDISM: ICD-10-CM

## 2022-05-20 LAB
ALBUMIN SERPL-MCNC: 4.7 G/DL (ref 3.5–5.2)
ALBUMIN/GLOB SERPL: 2 G/DL
ALP SERPL-CCNC: 92 U/L (ref 39–117)
ALT SERPL W P-5'-P-CCNC: 40 U/L (ref 1–41)
ANION GAP SERPL CALCULATED.3IONS-SCNC: 12.5 MMOL/L (ref 5–15)
AST SERPL-CCNC: 32 U/L (ref 1–40)
BILIRUB SERPL-MCNC: 0.7 MG/DL (ref 0–1.2)
BUN SERPL-MCNC: 11 MG/DL (ref 6–20)
BUN/CREAT SERPL: 11.7 (ref 7–25)
CALCIUM SPEC-SCNC: 9.7 MG/DL (ref 8.6–10.5)
CHLORIDE SERPL-SCNC: 106 MMOL/L (ref 98–107)
CHOLEST SERPL-MCNC: 108 MG/DL (ref 0–200)
CO2 SERPL-SCNC: 23.5 MMOL/L (ref 22–29)
CREAT SERPL-MCNC: 0.94 MG/DL (ref 0.76–1.27)
EGFRCR SERPLBLD CKD-EPI 2021: 98.1 ML/MIN/1.73
GLOBULIN UR ELPH-MCNC: 2.4 GM/DL
GLUCOSE SERPL-MCNC: 87 MG/DL (ref 65–99)
HDLC SERPL-MCNC: 37 MG/DL (ref 40–60)
LDLC SERPL CALC-MCNC: 50 MG/DL (ref 0–100)
LDLC/HDLC SERPL: 1.28 {RATIO}
POTASSIUM SERPL-SCNC: 3.9 MMOL/L (ref 3.5–5.2)
PROT SERPL-MCNC: 7.1 G/DL (ref 6–8.5)
SODIUM SERPL-SCNC: 142 MMOL/L (ref 136–145)
T4 FREE SERPL-MCNC: 1.56 NG/DL (ref 0.93–1.7)
TRIGL SERPL-MCNC: 118 MG/DL (ref 0–150)
TSH SERPL DL<=0.05 MIU/L-ACNC: 0.48 UIU/ML (ref 0.27–4.2)
VLDLC SERPL-MCNC: 21 MG/DL (ref 5–40)

## 2022-05-20 PROCEDURE — 80053 COMPREHEN METABOLIC PANEL: CPT

## 2022-05-20 PROCEDURE — 80061 LIPID PANEL: CPT

## 2022-05-20 PROCEDURE — 84443 ASSAY THYROID STIM HORMONE: CPT

## 2022-05-20 PROCEDURE — 84439 ASSAY OF FREE THYROXINE: CPT

## 2022-05-24 ENCOUNTER — TELEPHONE (OUTPATIENT)
Dept: FAMILY MEDICINE CLINIC | Facility: CLINIC | Age: 52
End: 2022-05-24

## 2022-05-24 DIAGNOSIS — E78.5 HYPERLIPIDEMIA, UNSPECIFIED HYPERLIPIDEMIA TYPE: ICD-10-CM

## 2022-05-24 DIAGNOSIS — I21.4 NSTEMI (NON-ST ELEVATED MYOCARDIAL INFARCTION): Primary | ICD-10-CM

## 2022-05-24 DIAGNOSIS — Z79.899 MEDICATION MANAGEMENT: ICD-10-CM

## 2022-05-24 NOTE — TELEPHONE ENCOUNTER
Pts reptha has been denied due to the order not coming from a cardiologist or endocrinologist. Per , okay to place order to cardio. Order has been placed and left detailed vm for pt requesting call back to confirm this would be okay.    [FreeTextEntry1] : AP and lateral\par \par Chest wall normal\par \par Cardiac silhouette presence of cardiomegaly\par \par Pleural space\par \par There is bilateral pleural effusion small on the right and possible small pleural effusion or pleural reaction of the left\par \par Parenchyma\par \par There is prime interstitial marking in the left lower lobe

## 2022-05-24 NOTE — TELEPHONE ENCOUNTER
Pt called office back and was made aware. Pt does have an appt on Friday with  and pt states he will speak with him about prescribing med at this appt.

## 2022-05-27 ENCOUNTER — OFFICE VISIT (OUTPATIENT)
Dept: CARDIOLOGY | Facility: CLINIC | Age: 52
End: 2022-05-27

## 2022-05-27 VITALS
DIASTOLIC BLOOD PRESSURE: 82 MMHG | WEIGHT: 196 LBS | SYSTOLIC BLOOD PRESSURE: 125 MMHG | HEART RATE: 67 BPM | BODY MASS INDEX: 29.7 KG/M2 | HEIGHT: 68 IN

## 2022-05-27 DIAGNOSIS — I25.10 CORONARY ARTERY DISEASE INVOLVING NATIVE CORONARY ARTERY OF NATIVE HEART WITHOUT ANGINA PECTORIS: Primary | ICD-10-CM

## 2022-05-27 DIAGNOSIS — I25.2 HISTORY OF NON-ST ELEVATION MYOCARDIAL INFARCTION (NSTEMI): ICD-10-CM

## 2022-05-27 DIAGNOSIS — E78.5 HYPERLIPIDEMIA LDL GOAL <70: ICD-10-CM

## 2022-05-27 DIAGNOSIS — Z95.5 S/P CORONARY ARTERY STENT PLACEMENT: ICD-10-CM

## 2022-05-27 PROCEDURE — 99203 OFFICE O/P NEW LOW 30 MIN: CPT | Performed by: INTERNAL MEDICINE

## 2022-05-27 PROCEDURE — 93000 ELECTROCARDIOGRAM COMPLETE: CPT | Performed by: INTERNAL MEDICINE

## 2022-05-27 RX ORDER — ASPIRIN 81 MG/1
81 TABLET ORAL DAILY
Qty: 90 TABLET | Refills: 3 | Status: SHIPPED | OUTPATIENT
Start: 2022-05-27 | End: 2023-03-03 | Stop reason: SDUPTHER

## 2022-05-27 RX ORDER — ATORVASTATIN CALCIUM 20 MG/1
80 TABLET, FILM COATED ORAL DAILY
Qty: 90 TABLET | Refills: 3
Start: 2022-05-27 | End: 2022-06-01 | Stop reason: SDUPTHER

## 2022-06-01 DIAGNOSIS — I25.10 CORONARY ARTERY DISEASE INVOLVING NATIVE CORONARY ARTERY OF NATIVE HEART WITHOUT ANGINA PECTORIS: ICD-10-CM

## 2022-06-01 DIAGNOSIS — I25.2 HISTORY OF NON-ST ELEVATION MYOCARDIAL INFARCTION (NSTEMI): ICD-10-CM

## 2022-06-01 DIAGNOSIS — E78.5 HYPERLIPIDEMIA LDL GOAL <70: ICD-10-CM

## 2022-06-01 RX ORDER — ATORVASTATIN CALCIUM 20 MG/1
80 TABLET, FILM COATED ORAL DAILY
Qty: 90 TABLET | Refills: 3
Start: 2022-06-01 | End: 2022-06-03 | Stop reason: SDUPTHER

## 2022-06-01 NOTE — TELEPHONE ENCOUNTER
Caller: Darrick Mooney    Relationship: Self    Best call back number: [phone]     Requested Prescriptions:   Requested Prescriptions     Pending Prescriptions Disp Refills   • atorvastatin (LIPITOR) 20 MG tablet 90 tablet 3     Sig: Take 4 tablets by mouth Daily.      Pharmacy where request should be sent:    [facility]  [address] [place], [place] [id number] [phone]    Does the patient have less than a 3 day supply:  [] Yes  [x] No    Mary Kay Ruiz Rep   06/01/22 12:37 EDT

## 2022-06-03 DIAGNOSIS — E78.5 HYPERLIPIDEMIA LDL GOAL <70: ICD-10-CM

## 2022-06-03 DIAGNOSIS — I25.2 HISTORY OF NON-ST ELEVATION MYOCARDIAL INFARCTION (NSTEMI): ICD-10-CM

## 2022-06-03 DIAGNOSIS — I25.10 CORONARY ARTERY DISEASE INVOLVING NATIVE CORONARY ARTERY OF NATIVE HEART WITHOUT ANGINA PECTORIS: ICD-10-CM

## 2022-06-03 RX ORDER — ATORVASTATIN CALCIUM 20 MG/1
80 TABLET, FILM COATED ORAL DAILY
Qty: 90 TABLET | Refills: 3
Start: 2022-06-03 | End: 2022-06-06 | Stop reason: SDUPTHER

## 2022-06-03 RX ORDER — ATORVASTATIN CALCIUM 20 MG/1
80 TABLET, FILM COATED ORAL DAILY
Qty: 90 TABLET | Refills: 3 | Status: CANCELLED
Start: 2022-06-03

## 2022-06-03 NOTE — TELEPHONE ENCOUNTER
Atorvastatin (Lipitor 20 mg) was sent on 06/01/2022. It was sent as an call in (oral). Please resend.  Mr. Mooney only has 2 pills left.

## 2022-06-06 ENCOUNTER — TELEPHONE (OUTPATIENT)
Dept: FAMILY MEDICINE CLINIC | Facility: CLINIC | Age: 52
End: 2022-06-06

## 2022-06-06 DIAGNOSIS — E78.5 HYPERLIPIDEMIA LDL GOAL <70: ICD-10-CM

## 2022-06-06 DIAGNOSIS — I25.10 CORONARY ARTERY DISEASE INVOLVING NATIVE CORONARY ARTERY OF NATIVE HEART WITHOUT ANGINA PECTORIS: ICD-10-CM

## 2022-06-06 DIAGNOSIS — I25.2 HISTORY OF NON-ST ELEVATION MYOCARDIAL INFARCTION (NSTEMI): ICD-10-CM

## 2022-06-06 RX ORDER — ATORVASTATIN CALCIUM 80 MG/1
80 TABLET, FILM COATED ORAL DAILY
Qty: 90 TABLET | Refills: 3 | Status: SHIPPED | OUTPATIENT
Start: 2022-06-06 | End: 2022-07-28 | Stop reason: ALTCHOICE

## 2022-06-06 RX ORDER — ATORVASTATIN CALCIUM 20 MG/1
80 TABLET, FILM COATED ORAL DAILY
Qty: 90 TABLET | Refills: 3 | Status: SHIPPED | OUTPATIENT
Start: 2022-06-06 | End: 2022-06-06

## 2022-06-06 NOTE — TELEPHONE ENCOUNTER
[facility] called and ask if they could do Lipitor 80 mg  1 po qd  #90, I gave a verbal (ask Dr. Martines in the hallway) with 3 refills. Not sure if you need to change in the med list.   Please advise

## 2022-06-07 ENCOUNTER — TELEPHONE (OUTPATIENT)
Dept: FAMILY MEDICINE CLINIC | Facility: CLINIC | Age: 52
End: 2022-06-07

## 2022-06-07 NOTE — TELEPHONE ENCOUNTER
Caller: Darrick Mooney    Relationship to patient: Self    Best call back number: [phone]    Patient is needing: PATIENT IS NEEDING AUTHORIZATION FOR HIS Evolocumab (REPATHA) solution auto-injector SureClick injection MEDICATION SENT OVER TO THE [facility]. PLEASE INFORM PATIENT WHEN COMPLETED SO HE CAN GO TO THE [facility].

## 2022-06-09 NOTE — TELEPHONE ENCOUNTER
Called Darrick Mooney and left detailed vm with information that his cardiologists office will need to be the provider that has to do the PA on the Repatha due to their office being the prescriber.

## 2022-06-20 ENCOUNTER — OFFICE VISIT (OUTPATIENT)
Dept: GASTROENTEROLOGY | Facility: CLINIC | Age: 52
End: 2022-06-20

## 2022-06-20 VITALS
SYSTOLIC BLOOD PRESSURE: 128 MMHG | BODY MASS INDEX: 29.34 KG/M2 | HEART RATE: 59 BPM | WEIGHT: 193.6 LBS | HEIGHT: 68 IN | DIASTOLIC BLOOD PRESSURE: 84 MMHG

## 2022-06-20 DIAGNOSIS — K21.9 GASTROESOPHAGEAL REFLUX DISEASE, UNSPECIFIED WHETHER ESOPHAGITIS PRESENT: ICD-10-CM

## 2022-06-20 DIAGNOSIS — Z12.12 SCREENING FOR COLORECTAL CANCER: Primary | ICD-10-CM

## 2022-06-20 DIAGNOSIS — Z12.11 SCREENING FOR COLORECTAL CANCER: Primary | ICD-10-CM

## 2022-06-20 PROCEDURE — 99203 OFFICE O/P NEW LOW 30 MIN: CPT | Performed by: NURSE PRACTITIONER

## 2022-06-20 NOTE — PROGRESS NOTES
Chief Complaint  Establish Care    Darrick Mooney is a 52 y.o. male who presents to [facility] on referral from Wood Martines,* for a gastroenterology evaluation of reflux and screening for colorectal cancer.      History of Present Illness    Last colonoscopy 2010/11 in [place].  Denies previous colon polyps and family hx of colon cancer.      Denies change in bowel movement, hematochezia and melena.      Admits acid reflux that's been present for many years.  Previously was taking omeprazole, but no longer taking this.  He's avoiding foods that are bothersome and feels this is effective.       Recent MI, now on aspirin daily and brilinta.      Past Medical History:   Diagnosis Date   • Hyperlipidemia    • Hypertension    • Labral tear of hip joint    • Migraine headache    • Myocardial infarction (HCC)    • RLS (restless legs syndrome)    • Sciatica    • Smokeless tobacco use 10/10/2019    encouraged him to quit   • Spinal stenosis at L4-L5 level CURRENTLY       Past Surgical History:   Procedure Laterality Date   • APPENDECTOMY     • COLONOSCOPY  06/05/2011   • ELBOW ARTHROPLASTY Left 2014   • NOSE SURGERY  2002    deviated septum         Current Outpatient Medications:   •  aspirin (aspirin) 81 MG EC tablet, Take 1 tablet by mouth Daily., Disp: 90 tablet, Rfl: 3  •  atorvastatin (LIPITOR) 80 MG tablet, Take 1 tablet by mouth Daily., Disp: 90 tablet, Rfl: 3  •  cholecalciferol (VITAMIN D3) 25 MCG (1000 UT) tablet, Take 1,000 Units by mouth Daily., Disp: , Rfl:   •  Multiple Vitamins-Minerals (Multivitamin Adult, Minerals,) tablet, Take 1 tablet by mouth Daily., Disp: , Rfl:   •  Synthroid 112 MCG tablet, Take 1 tablet by mouth Daily., Disp: 180 tablet, Rfl: 0  •  ticagrelor (BRILINTA) 90 MG tablet tablet, Take 1 tablet by mouth 2 (Two) Times a Day., Disp: 180 tablet, Rfl: 2  •  vitamin B-12 (CYANOCOBALAMIN) 500 MCG tablet, Take 500 mcg by mouth Daily., Disp: ,  "Rfl:   •  Evolocumab (REPATHA) solution auto-injector SureClick injection, Inject 1 mL under the skin into the appropriate area as directed Every 14 (Fourteen) Days., Disp: 6 mL, Rfl: 3     No Known Allergies    Family History   Problem Relation Age of Onset   • Heart disease Mother    • Heart disease Maternal Grandmother    • Diabetes Maternal Grandmother         mellitus   • Hypertension Maternal Grandmother    • Cancer Paternal Grandmother    • Cancer Other    • Heart disease Other    • Heart disease Other         Social History     Social History Narrative   • Not on file       Immunization:  Immunization History   Administered Date(s) Administered   • COVID-19 (MODERNA) 1st, 2nd, 3rd Dose Only 04/22/2021, 05/20/2021   • Influenza, Unspecified 10/13/2020        Objective       Vital Signs:   /84 (BP Location: Left arm, Patient Position: Sitting)   Pulse 59   Ht 172.7 cm (68\")   Wt 87.8 kg (193 lb 9.6 oz)   BMI 29.44 kg/m²       Physical Exam  Constitutional:       General: He is not in acute distress.     Appearance: Normal appearance. He is well-developed and normal weight.   HENT:      Head: Normocephalic and atraumatic.   Eyes:      Conjunctiva/sclera: Conjunctivae normal.      Pupils: Pupils are equal, round, and reactive to light.      Visual Fields: Right eye visual fields normal and left eye visual fields normal.   Cardiovascular:      Rate and Rhythm: Normal rate and regular rhythm.      Heart sounds: Normal heart sounds.   Pulmonary:      Effort: Pulmonary effort is normal. No retractions.      Breath sounds: Normal breath sounds and air entry.   Abdominal:      General: Bowel sounds are normal.      Palpations: Abdomen is soft.      Tenderness: There is no abdominal tenderness.      Comments: No appreciable hepatosplenomegaly or ascites   Musculoskeletal:         General: Normal range of motion.      Cervical back: Neck supple.      Right lower leg: No edema.      Left lower leg: No edema. "   Lymphadenopathy:      Cervical: No cervical adenopathy.   Skin:     General: Skin is warm and dry.      Findings: No lesion.   Neurological:      General: No focal deficit present.      Mental Status: He is alert and oriented to person, place, and time.   Psychiatric:         Mood and Affect: Mood and affect normal.         Behavior: Behavior normal.         Result Review :   The following data was reviewed by: JOSE Sigala on 06/20/2022:      CMP    CMP 12/8/21 5/20/22   Glucose 97 87   BUN 13 11   Creatinine 1.14 0.94   eGFR Non African Am 68    Sodium 143 142   Potassium 4.3 3.9   Chloride 103 106   Calcium 10.6 (A) 9.7   Albumin 4.70 4.70   Total Bilirubin 0.5 0.7   Alkaline Phosphatase 88 92   AST (SGOT) 32 32   ALT (SGPT) 45 (A) 40   (A) Abnormal value                          Assessment and Plan    Diagnoses and all orders for this visit:    1. Screening for colorectal cancer (Primary)  -     Cologuard - Stool, Per Rectum    2. Gastroesophageal reflux disease, unspecified whether esophagitis present      Encouraged patient to take ASA with food to reduce GI upset.  Continue to avoid foods that trigger reflux.          Follow Up   Return if symptoms worsen or fail to improve.  Patient was given instructions and counseling regarding his condition or for health maintenance advice. Please see specific information pulled into the AVS if appropriate.

## 2022-06-28 ENCOUNTER — TELEPHONE (OUTPATIENT)
Dept: CARDIOLOGY | Facility: CLINIC | Age: 52
End: 2022-06-28

## 2022-06-28 ENCOUNTER — HOSPITAL ENCOUNTER (OUTPATIENT)
Dept: GENERAL RADIOLOGY | Facility: HOSPITAL | Age: 52
Discharge: HOME | End: 2022-06-28
Admitting: FAMILY MEDICINE

## 2022-06-28 ENCOUNTER — OFFICE VISIT (OUTPATIENT)
Dept: FAMILY MEDICINE CLINIC | Facility: CLINIC | Age: 52
End: 2022-06-28

## 2022-06-28 VITALS
TEMPERATURE: 96.7 F | WEIGHT: 197 LBS | DIASTOLIC BLOOD PRESSURE: 79 MMHG | OXYGEN SATURATION: 99 % | HEART RATE: 55 BPM | BODY MASS INDEX: 29.86 KG/M2 | SYSTOLIC BLOOD PRESSURE: 123 MMHG | HEIGHT: 68 IN

## 2022-06-28 DIAGNOSIS — S60.041A CONTUSION OF RIGHT RING FINGER WITHOUT DAMAGE TO NAIL, INITIAL ENCOUNTER: ICD-10-CM

## 2022-06-28 DIAGNOSIS — E78.5 HYPERLIPIDEMIA, UNSPECIFIED HYPERLIPIDEMIA TYPE: Primary | ICD-10-CM

## 2022-06-28 DIAGNOSIS — I21.4 NSTEMI (NON-ST ELEVATED MYOCARDIAL INFARCTION): ICD-10-CM

## 2022-06-28 PROCEDURE — 73130 X-RAY EXAM OF HAND: CPT

## 2022-06-28 PROCEDURE — 99213 OFFICE O/P EST LOW 20 MIN: CPT | Performed by: FAMILY MEDICINE

## 2022-06-28 NOTE — PROGRESS NOTES
Chief Complaint   Patient presents with   • Hyperlipidemia     6 month follow up        Subjective     Darrick Mooney  has a past medical history of Hyperlipidemia, Hypertension, Labral tear of hip joint, Migraine headache, Myocardial infarction (HCC), RLS (restless legs syndrome), and Spinal stenosis at L4-L5 level (CURRENTLY).    Coronary artery disease- he denies any recurrent chest pain tightness or heaviness.  He has resumed a lot of physical activity without any restrictions.    Hyperlipidemia- we have been working through getting his Repatha covered.  He initially required a prior authorization which was obtained but now he needs one also for Tacat[employer]¬.  He is also seeing cardiologist who agreed with therapy.    Finger pain- he states when he was in [facility] in January he is playing volleyball and the ball hit off the end of his right ring finger.  Since then it is remained rather painful.      PHQ-2 Depression Screening  Little interest or pleasure in doing things?     Feeling down, depressed, or hopeless?     PHQ-2 Total Score     PHQ-9 Depression Screening  Little interest or pleasure in doing things?     Feeling down, depressed, or hopeless?     Trouble falling or staying asleep, or sleeping too much?     Feeling tired or having little energy?     Poor appetite or overeating?     Feeling bad about yourself - or that you are a failure or have let yourself or your family down?     Trouble concentrating on things, such as reading the newspaper or watching television?     Moving or speaking so slowly that other people could have noticed? Or the opposite - being so fidgety or restless that you have been moving around a lot more than usual?     Thoughts that you would be better off dead, or of hurting yourself in some way?     PHQ-9 Total Score     If you checked off any problems, how difficult have these problems made it for you to do your work, take care of things at home, or get along with other people?        No Known Allergies    Prior to Admission medications    Medication Sig Start Date End Date Taking? Authorizing Provider   aspirin (aspirin) 81 MG EC tablet Take 1 tablet by mouth Daily. 5/27/22  Yes Laith Navarro MD   atorvastatin (LIPITOR) 80 MG tablet Take 1 tablet by mouth Daily. 6/6/22  Yes Wood Martines DO   cholecalciferol (VITAMIN D3) 25 MCG (1000 UT) tablet Take 1,000 Units by mouth Daily.   Yes ProviderAugie MD   Multiple Vitamins-Minerals (Multivitamin Adult, Minerals,) tablet Take 1 tablet by mouth Daily.   Yes Augie Rivera MD   Synthroid 112 MCG tablet Take 1 tablet by mouth Daily. 11/19/21  Yes Wood Martines DO   ticagrelor (BRILINTA) 90 MG tablet tablet Take 1 tablet by mouth 2 (Two) Times a Day. 5/27/22  Yes Laith Navarro MD   vitamin B-12 (CYANOCOBALAMIN) 500 MCG tablet Take 500 mcg by mouth Daily.   Yes ProviderAugie MD   Evolocumab (REPATHA) solution auto-injector SureClick injection Inject 1 mL under the skin into the appropriate area as directed Every 14 (Fourteen) Days. 5/27/22   Laith Navarro MD        Patient Active Problem List   Diagnosis   • Hyperlipidemia   • RLS (restless legs syndrome)   • Acquired hypothyroidism   • Sciatic leg pain   • Benign prostatic hyperplasia without lower urinary tract symptoms   • Smokeless tobacco use   • Migraine headache   • Labral tear of hip joint   • Spinal stenosis of lumbar region with neurogenic claudication   • NSTEMI (non-ST elevated myocardial infarction) (HCC)   • Contusion of right ring finger without damage to nail        Past Surgical History:   Procedure Laterality Date   • APPENDECTOMY     • COLONOSCOPY  06/05/2011   • ELBOW ARTHROPLASTY Left 2014   • NOSE SURGERY  2002    deviated septum       Social History     Socioeconomic History   • Marital status:    Tobacco Use   • Smoking status: Never Smoker   • Smokeless tobacco: Current User     Types: Chew   • Tobacco  "comment: 1 can every 2 days   Vaping Use   • Vaping Use: Never used   Substance and Sexual Activity   • Alcohol use: Yes     Comment: occaionally    • Drug use: Never   • Sexual activity: Defer       Family History   Problem Relation Age of Onset   • Heart disease Mother    • Heart disease Maternal Grandmother    • Diabetes Maternal Grandmother         mellitus   • Hypertension Maternal Grandmother    • Cancer Paternal Grandmother    • Cancer Other    • Heart disease Other    • Heart disease Other        Family history, surgical history, past medical history, Allergies and meds reviewed with patient today and updated in [facility] EMR.     ROS:  Review of Systems   Constitutional: Negative for fatigue.   HENT: Positive for congestion, postnasal drip and rhinorrhea.    Eyes: Negative for blurred vision and visual disturbance.   Respiratory: Positive for cough, shortness of breath and wheezing. Negative for chest tightness.    Cardiovascular: Negative for chest pain and palpitations.   Allergic/Immunologic: Positive for environmental allergies.   Neurological: Negative for headache.   Psychiatric/Behavioral: Negative for depressed mood. The patient is not nervous/anxious.        OBJECTIVE:  Vitals:    06/28/22 0758   BP: 123/79   Pulse: 55   Temp: 96.7 °F (35.9 °C)   TempSrc: Temporal   SpO2: 99%   Weight: 89.4 kg (197 lb)   Height: 172.7 cm (68\")     No exam data present   Body mass index is 29.95 kg/m².  No LMP for male patient.    Physical Exam  Vitals and nursing note reviewed.   Constitutional:       General: He is not in acute distress.     Appearance: Normal appearance. He is normal weight.   HENT:      Head: Normocephalic.      Right Ear: Tympanic membrane, ear canal and external ear normal.      Left Ear: Tympanic membrane, ear canal and external ear normal.      Nose: Nose normal.      Mouth/Throat:      Mouth: Mucous membranes are moist.      Pharynx: Oropharynx is clear.   Eyes:      General: No scleral " icterus.     Conjunctiva/sclera: Conjunctivae normal.      Pupils: Pupils are equal, round, and reactive to light.   Cardiovascular:      Rate and Rhythm: Normal rate and regular rhythm.      Pulses: Normal pulses.      Heart sounds: Normal heart sounds. No murmur heard.  Pulmonary:      Effort: Pulmonary effort is normal.      Breath sounds: Normal breath sounds. No wheezing, rhonchi or rales.   Musculoskeletal:      Right hand: Deformity (Right second DIP joint) and tenderness present. Normal range of motion.      Cervical back: Neck supple. No rigidity or tenderness.   Lymphadenopathy:      Cervical: No cervical adenopathy.   Skin:     General: Skin is warm and dry.      Coloration: Skin is not jaundiced.      Findings: No rash.   Neurological:      General: No focal deficit present.      Mental Status: He is alert and oriented to person, place, and time.   Psychiatric:         Mood and Affect: Mood normal.         Thought Content: Thought content normal.         Judgment: Judgment normal.         Procedures    No visits with results within 30 Day(s) from this visit.   Latest known visit with results is:   Lab on 05/20/2022   Component Date Value Ref Range Status   • Glucose 05/20/2022 87  65 - 99 mg/dL Final   • BUN 05/20/2022 11  6 - 20 mg/dL Final   • Creatinine 05/20/2022 0.94  0.76 - 1.27 mg/dL Final   • Sodium 05/20/2022 142  136 - 145 mmol/L Final   • Potassium 05/20/2022 3.9  3.5 - 5.2 mmol/L Final   • Chloride 05/20/2022 106  98 - 107 mmol/L Final   • CO2 05/20/2022 23.5  22.0 - 29.0 mmol/L Final   • Calcium 05/20/2022 9.7  8.6 - 10.5 mg/dL Final   • Total Protein 05/20/2022 7.1  6.0 - 8.5 g/dL Final   • Albumin 05/20/2022 4.70  3.50 - 5.20 g/dL Final   • ALT (SGPT) 05/20/2022 40  1 - 41 U/L Final   • AST (SGOT) 05/20/2022 32  1 - 40 U/L Final   • Alkaline Phosphatase 05/20/2022 92  39 - 117 U/L Final   • Total Bilirubin 05/20/2022 0.7  0.0 - 1.2 mg/dL Final   • Globulin 05/20/2022 2.4  gm/dL Final   •  A/G Ratio 05/20/2022 2.0  g/dL Final   • BUN/Creatinine Ratio 05/20/2022 11.7  7.0 - 25.0 Final   • Anion Gap 05/20/2022 12.5  5.0 - 15.0 mmol/L Final   • eGFR 05/20/2022 98.1  >60.0 mL/min/1.73 Final    National Kidney Foundation and American Society of Nephrology (ASN) Task Force recommended calculation based on the Chronic Kidney Disease Epidemiology Collaboration (CKD-EPI) equation refit without adjustment for race.   • Total Cholesterol 05/20/2022 108  0 - 200 mg/dL Final   • Triglycerides 05/20/2022 118  0 - 150 mg/dL Final   • HDL Cholesterol 05/20/2022 37 (A) 40 - 60 mg/dL Final   • LDL Cholesterol  05/20/2022 50  0 - 100 mg/dL Final   • VLDL Cholesterol 05/20/2022 21  5 - 40 mg/dL Final   • LDL/HDL Ratio 05/20/2022 1.28   Final   • TSH 05/20/2022 0.485  0.270 - 4.200 uIU/mL Final   • Free T4 05/20/2022 1.56  0.93 - 1.70 ng/dL Final    T4 results may be falsely increased if patient taking Biotin.       ASSESSMENT/ PLAN:    Diagnoses and all orders for this visit:    1. Hyperlipidemia, unspecified hyperlipidemia type (Primary)  Assessment & Plan:  Hopefully he will be able to get the Repatha here soon and then will be able to recheck his lipid profiles after a couple months of usage.      2. NSTEMI (non-ST elevated myocardial infarction) (HCC)  Assessment & Plan:  He is currently doing well without any concerns or complaints.      3. Contusion of right ring finger without damage to nail, initial encounter  Assessment & Plan:  We will get an x-ray of his right hand and ring finger.  Unfortunately its not for nearly 6 months.    Orders:  -     XR Hand 3+ View Right; Future      Orders Placed Today:     No orders of the defined types were placed in this encounter.       Management Plan:     An After Visit Summary was printed and given to the patient at discharge.    Follow-up: Return in about 6 months (around 12/28/2022) for Recheck.    Wood Martines DO 6/28/2022 08:22 EDT  This note was electronically  signed.

## 2022-06-28 NOTE — ASSESSMENT & PLAN NOTE
We will get an x-ray of his right hand and ring finger.  Unfortunately its not for nearly 6 months.

## 2022-06-28 NOTE — ASSESSMENT & PLAN NOTE
Hopefully he will be able to get the Repatha here soon and then will be able to recheck his lipid profiles after a couple months of usage.

## 2022-06-29 DIAGNOSIS — T14.8XXA AVULSION FRACTURE: Primary | ICD-10-CM

## 2022-06-30 ENCOUNTER — TELEPHONE (OUTPATIENT)
Dept: ORTHOPEDIC SURGERY | Facility: CLINIC | Age: 52
End: 2022-06-30

## 2022-06-30 ENCOUNTER — PATIENT ROUNDING (BHMG ONLY) (OUTPATIENT)
Dept: GASTROENTEROLOGY | Facility: CLINIC | Age: 52
End: 2022-06-30

## 2022-06-30 NOTE — PROGRESS NOTES
6/30/2022      Hello, may I speak with Darrick Mooney     My name is Rosita, Clinical Coordinator. I am calling from [facility].    Before we get started may I verify your date of birth? 1970    I am calling to officially welcome you to our practice and ask about your recent visit. Is this a good time to talk? No left message     Tell me about your visit with us. What things went well?         We're always looking for ways to make our patients' experiences even better. Do you have recommendations on ways we may improve?    Overall were you satisfied with your first visit to our practice?    I appreciate you taking the time to speak with me today. Is there anything else I can do for you?    We would really appreciate you completing a survey if you receive one in the mail or via email.       Thank you, and have a great day.

## 2022-07-05 ENCOUNTER — OFFICE VISIT (OUTPATIENT)
Dept: ORTHOPEDIC SURGERY | Facility: CLINIC | Age: 52
End: 2022-07-05

## 2022-07-05 VITALS — OXYGEN SATURATION: 99 % | HEART RATE: 69 BPM | WEIGHT: 204.4 LBS | HEIGHT: 68 IN | BODY MASS INDEX: 30.98 KG/M2

## 2022-07-05 DIAGNOSIS — M20.019 MALLET FRACTURE, CLOSED, INITIAL ENCOUNTER: Primary | ICD-10-CM

## 2022-07-05 DIAGNOSIS — S62.639A MALLET FRACTURE, CLOSED, INITIAL ENCOUNTER: Primary | ICD-10-CM

## 2022-07-05 PROCEDURE — 29130 APPL FINGER SPLINT STATIC: CPT | Performed by: ORTHOPAEDIC SURGERY

## 2022-07-05 PROCEDURE — 99203 OFFICE O/P NEW LOW 30 MIN: CPT | Performed by: ORTHOPAEDIC SURGERY

## 2022-07-05 NOTE — PROGRESS NOTES
"Chief Complaint  Initial Evaluation of the Right Hand     Subjective      Darrick Mooney presents to [facility] for an evaluation of right hand. Patient was playing volleyball, he went for a block when another estelle hit his right hand. This occurred in February. He saw Dr. Martines for a checkup and mentioned his right hand was still in pain. An x-ray was ordered last week which revealed an avulsion fracture with slight displacement of the dorsal base of the 4th distal phalanx at the DIP joint with partial healing.     No Known Allergies     Social History     Socioeconomic History   • Marital status:    Tobacco Use   • Smoking status: Never Smoker   • Smokeless tobacco: Current User     Types: Chew   • Tobacco comment: 1 can every 2 days   Vaping Use   • Vaping Use: Never used   Substance and Sexual Activity   • Alcohol use: Yes     Comment: occaionally    • Drug use: Never   • Sexual activity: Defer        Review of Systems     Objective   Vital Signs:   Pulse 69   Ht 172.7 cm (68\")   Wt 92.7 kg (204 lb 6.4 oz)   SpO2 99%   BMI 31.08 kg/m²       Physical Exam  Constitutional:       Appearance: Normal appearance. Patient is well-developed and normal weight.   HENT:      Head: Normocephalic.      Right Ear: Hearing and external ear normal.      Left Ear: Hearing and external ear normal.      Nose: Nose normal.   Eyes:      Conjunctiva/sclera: Conjunctivae normal.   Cardiovascular:      Rate and Rhythm: Normal rate.   Pulmonary:      Effort: Pulmonary effort is normal.      Breath sounds: No wheezing or rales.   Abdominal:      Palpations: Abdomen is soft.      Tenderness: There is no abdominal tenderness.   Musculoskeletal:      Cervical back: Normal range of motion.   Skin:     Findings: No rash.   Neurological:      Mental Status: Patient is alert and oriented to person, place, and time.   Psychiatric:         Mood and Affect: Mood and affect normal.         Judgment: " Judgment normal.       Ortho Exam      RIGHT HAND: Full wrist flexion and extension. Sensation grossly intact. Neurovascular intact.  Radial pulse 2+, ulnar pulse 2+. Pain with fourth finger range of motion. No swelling, skin discoloration or atrophy. Skin intact.       Orthopedic Injury Treatment    Date/Time: 7/5/2022 10:07 AM  Performed by: Rosy Rush MD  Authorized by: Rosy Rush MD   Injury location: finger  Location details: right ring finger    Anesthesia:  Local anesthesia used: no    Sedation:  Patient sedated: no    Immobilization: splint  Splint type: static finger  Post-procedure neurovascular assessment: post-procedure neurovascularly intact  Patient tolerance: patient tolerated the procedure well with no immediate complications            Imaging Results (Most Recent)     None           Result Review :       XR Hand 3+ View Right    Result Date: 6/28/2022  Narrative: PROCEDURE: XR HAND 3+ VW RIGHT  COMPARISON: None  INDICATIONS: CONTINUED RIGHT HAND DISTAL THIRD/FOURTH DIGIT PAIN POST SPORTS INJURY X 1-2 MONTHS AGO  FINDINGS:  There is a fracture of the dorsal base of the 4th distal phalanx with 1 mm displacement.  No fracture elsewhere in the hand including the 3rd finger.  No dislocation.  Minimal degenerative changes      Impression:  Avulsion fracture with slight displacement of the dorsal base of the 4th distal phalanx at the DIP joint.  This appears partially healed.      BIN MCCRACKEN MD       Electronically Signed and Approved By: BIN MCCRACKEN MD on 6/28/2022 at 9:00                      Assessment and Plan     Diagnoses and all orders for this visit:    1. Mallet fracture, right fourth finger, closed, initial encounter (Primary)        He has some healing of the right fourth mallet finger fracture. Patient offered a splint but he states pain is tolerable and only present with increased activities. Patient given a stack splint. Repeat films.     Call or return if worsening  symptoms.    Follow Up     4 weeks.       Patient was given instructions and counseling regarding his condition or for health maintenance advice. Please see specific information pulled into the AVS if appropriate.     Scribed for Rosy Rush MD by Mansi Shaver.  07/05/22   09:50 EDT    I have personally performed the services described in this document as scribed by the above individual and it is both accurate and complete. Rosy Rush MD 07/05/22

## 2022-07-11 ENCOUNTER — TELEPHONE (OUTPATIENT)
Dept: GASTROENTEROLOGY | Facility: CLINIC | Age: 52
End: 2022-07-11

## 2022-07-11 LAB — NONINV COLON CA DNA+OCC BLD SCRN STL QL: NEGATIVE

## 2022-07-11 NOTE — TELEPHONE ENCOUNTER
----- Message from JOSE Sigala sent at 7/11/2022  9:58 AM EDT -----  Please let patient know that Cologuard is negative.  Recommend repeat Cologuard in 3 years.  Please place in recall.

## 2022-07-20 ENCOUNTER — APPOINTMENT (OUTPATIENT)
Dept: GENERAL RADIOLOGY | Facility: HOSPITAL | Age: 52
End: 2022-07-20

## 2022-07-20 ENCOUNTER — HOSPITAL ENCOUNTER (EMERGENCY)
Facility: HOSPITAL | Age: 52
Discharge: HOME | End: 2022-07-20
Attending: EMERGENCY MEDICINE | Admitting: EMERGENCY MEDICINE

## 2022-07-20 VITALS
HEART RATE: 62 BPM | DIASTOLIC BLOOD PRESSURE: 73 MMHG | HEIGHT: 68 IN | SYSTOLIC BLOOD PRESSURE: 100 MMHG | BODY MASS INDEX: 29.84 KG/M2 | TEMPERATURE: 98.2 F | RESPIRATION RATE: 20 BRPM | OXYGEN SATURATION: 96 % | WEIGHT: 196.87 LBS

## 2022-07-20 DIAGNOSIS — R07.9 CHEST PAIN, UNSPECIFIED TYPE: Primary | ICD-10-CM

## 2022-07-20 LAB
ALBUMIN SERPL-MCNC: 5.4 G/DL (ref 3.5–5.2)
ALBUMIN/GLOB SERPL: 2.3 G/DL
ALP SERPL-CCNC: 107 U/L (ref 39–117)
ALT SERPL W P-5'-P-CCNC: 53 U/L (ref 1–41)
ANION GAP SERPL CALCULATED.3IONS-SCNC: 10.6 MMOL/L (ref 5–15)
AST SERPL-CCNC: 48 U/L (ref 1–40)
BASOPHILS # BLD AUTO: 0.05 10*3/MM3 (ref 0–0.2)
BASOPHILS NFR BLD AUTO: 0.5 % (ref 0–1.5)
BILIRUB SERPL-MCNC: 1.3 MG/DL (ref 0–1.2)
BUN SERPL-MCNC: 13 MG/DL (ref 6–20)
BUN/CREAT SERPL: 8.7 (ref 7–25)
CALCIUM SPEC-SCNC: 10.6 MG/DL (ref 8.6–10.5)
CHLORIDE SERPL-SCNC: 99 MMOL/L (ref 98–107)
CK MB SERPL-CCNC: 7.86 NG/ML
CK SERPL-CCNC: 482 U/L (ref 20–200)
CO2 SERPL-SCNC: 29.4 MMOL/L (ref 22–29)
CREAT SERPL-MCNC: 1.5 MG/DL (ref 0.76–1.27)
DEPRECATED RDW RBC AUTO: 36.6 FL (ref 37–54)
EGFRCR SERPLBLD CKD-EPI 2021: 56 ML/MIN/1.73
EOSINOPHIL # BLD AUTO: 0.14 10*3/MM3 (ref 0–0.4)
EOSINOPHIL NFR BLD AUTO: 1.4 % (ref 0.3–6.2)
ERYTHROCYTE [DISTWIDTH] IN BLOOD BY AUTOMATED COUNT: 12.1 % (ref 12.3–15.4)
GLOBULIN UR ELPH-MCNC: 2.4 GM/DL
GLUCOSE SERPL-MCNC: 97 MG/DL (ref 65–99)
HCT VFR BLD AUTO: 48 % (ref 37.5–51)
HGB BLD-MCNC: 16.4 G/DL (ref 13–17.7)
HOLD SPECIMEN: NORMAL
IMM GRANULOCYTES # BLD AUTO: 0.03 10*3/MM3 (ref 0–0.05)
IMM GRANULOCYTES NFR BLD AUTO: 0.3 % (ref 0–0.5)
LIPASE SERPL-CCNC: 36 U/L (ref 13–60)
LYMPHOCYTES # BLD AUTO: 1.98 10*3/MM3 (ref 0.7–3.1)
LYMPHOCYTES NFR BLD AUTO: 19.3 % (ref 19.6–45.3)
MAGNESIUM SERPL-MCNC: 2.1 MG/DL (ref 1.6–2.6)
MCH RBC QN AUTO: 28.6 PG (ref 26.6–33)
MCHC RBC AUTO-ENTMCNC: 34.2 G/DL (ref 31.5–35.7)
MCV RBC AUTO: 83.6 FL (ref 79–97)
MONOCYTES # BLD AUTO: 0.53 10*3/MM3 (ref 0.1–0.9)
MONOCYTES NFR BLD AUTO: 5.2 % (ref 5–12)
NEUTROPHILS NFR BLD AUTO: 7.51 10*3/MM3 (ref 1.7–7)
NEUTROPHILS NFR BLD AUTO: 73.3 % (ref 42.7–76)
NRBC BLD AUTO-RTO: 0 /100 WBC (ref 0–0.2)
NT-PROBNP SERPL-MCNC: <5 PG/ML (ref 0–900)
PLATELET # BLD AUTO: 217 10*3/MM3 (ref 140–450)
PMV BLD AUTO: 10.1 FL (ref 6–12)
POTASSIUM SERPL-SCNC: 4 MMOL/L (ref 3.5–5.2)
PROT SERPL-MCNC: 7.8 G/DL (ref 6–8.5)
RBC # BLD AUTO: 5.74 10*6/MM3 (ref 4.14–5.8)
SODIUM SERPL-SCNC: 139 MMOL/L (ref 136–145)
TROPONIN I SERPL-MCNC: 0 NG/ML (ref 0–0.6)
TROPONIN I SERPL-MCNC: 0 NG/ML (ref 0–0.6)
WBC NRBC COR # BLD: 10.24 10*3/MM3 (ref 3.4–10.8)
WHOLE BLOOD HOLD COAG: NORMAL
WHOLE BLOOD HOLD SPECIMEN: NORMAL

## 2022-07-20 PROCEDURE — 36415 COLL VENOUS BLD VENIPUNCTURE: CPT

## 2022-07-20 PROCEDURE — 84484 ASSAY OF TROPONIN QUANT: CPT

## 2022-07-20 PROCEDURE — 83735 ASSAY OF MAGNESIUM: CPT

## 2022-07-20 PROCEDURE — 82553 CREATINE MB FRACTION: CPT

## 2022-07-20 PROCEDURE — 82550 ASSAY OF CK (CPK): CPT

## 2022-07-20 PROCEDURE — 83880 ASSAY OF NATRIURETIC PEPTIDE: CPT

## 2022-07-20 PROCEDURE — 80053 COMPREHEN METABOLIC PANEL: CPT

## 2022-07-20 PROCEDURE — 71045 X-RAY EXAM CHEST 1 VIEW: CPT

## 2022-07-20 PROCEDURE — 93010 ELECTROCARDIOGRAM REPORT: CPT | Performed by: INTERNAL MEDICINE

## 2022-07-20 PROCEDURE — 85025 COMPLETE CBC W/AUTO DIFF WBC: CPT

## 2022-07-20 PROCEDURE — 93005 ELECTROCARDIOGRAM TRACING: CPT

## 2022-07-20 PROCEDURE — 99283 EMERGENCY DEPT VISIT LOW MDM: CPT

## 2022-07-20 PROCEDURE — 93005 ELECTROCARDIOGRAM TRACING: CPT | Performed by: EMERGENCY MEDICINE

## 2022-07-20 PROCEDURE — 83690 ASSAY OF LIPASE: CPT

## 2022-07-20 RX ORDER — ASPIRIN 81 MG/1
324 TABLET, CHEWABLE ORAL ONCE
Status: DISCONTINUED | OUTPATIENT
Start: 2022-07-20 | End: 2022-07-20 | Stop reason: HOSPADM

## 2022-07-20 RX ORDER — SODIUM CHLORIDE 0.9 % (FLUSH) 0.9 %
10 SYRINGE (ML) INJECTION AS NEEDED
Status: DISCONTINUED | OUTPATIENT
Start: 2022-07-20 | End: 2022-07-20 | Stop reason: HOSPADM

## 2022-07-20 NOTE — ED PROVIDER NOTES
Time: 3:50 PM EDT  Arrived by: private car  Chief Complaint: Chest pain and seizures  History provided by: patient  History is limited by: N/A     History of Present Illness:  Patient is a 52 y.o. year old male who presents to the emergency department with chest pains and seizures. Pt states that he has had ongoing chest pains for a few days. Pt states that his chest pain began to worsen after going on a run this morning (7/20/2022). Pt states that he had a myocardial infarction this past February and had 1 stent placed. Pt states that he had a seizure last night (7/19/2022). He reports that he remembers waking up and feeling confused. Pt's seizure last night was unwitnessed. Pt states that he has had seizures in the past but denies seeing a neurologist for his seizures and denies taking any seizure medication.     Pt denies any tobacco and drug use but consumes ETOH.     Similar Symptoms Previously: no  Recently seen: no    Patient Care Team  Primary Care Provider: Wood Martines DO    Past Medical History:     No Known Allergies  Past Medical History:   Diagnosis Date   • Hyperlipidemia    • Hypertension    • Labral tear of hip joint    • Migraine headache    • Myocardial infarction (HCC)    • RLS (restless legs syndrome)    • Spinal stenosis at L4-L5 level CURRENTLY     Past Surgical History:   Procedure Laterality Date   • APPENDECTOMY     • COLONOSCOPY  06/05/2011   • ELBOW ARTHROPLASTY Left 2014   • NOSE SURGERY  2002    deviated septum     Family History   Problem Relation Age of Onset   • Heart disease Mother    • Heart disease Maternal Grandmother    • Diabetes Maternal Grandmother         mellitus   • Hypertension Maternal Grandmother    • Cancer Paternal Grandmother    • Cancer Other    • Heart disease Other    • Heart disease Other        Home Medications:  Prior to Admission medications    Medication Sig Start Date End Date Taking? Authorizing Provider   aspirin (aspirin) 81 MG EC tablet Take  1 tablet by mouth Daily. 5/27/22   Laith Navarro MD   atorvastatin (LIPITOR) 80 MG tablet Take 1 tablet by mouth Daily. 6/6/22   Wood Martines DO   cholecalciferol (VITAMIN D3) 25 MCG (1000 UT) tablet Take 1,000 Units by mouth Daily.    ProviderAugie MD   Ginkgo Biloba (GINKOBA PO) Take  by mouth.    Emergency, Nurse Epic, RN   Multiple Vitamins-Minerals (Multivitamin Adult, Minerals,) tablet Take 1 tablet by mouth Daily.    ProviderAugie MD   Synthroid 112 MCG tablet Take 1 tablet by mouth Daily. 11/19/21   Wood Martines DO   ticagrelor (BRILINTA) 90 MG tablet tablet Take 1 tablet by mouth 2 (Two) Times a Day. 5/27/22   Laith Navarro MD   vitamin B-12 (CYANOCOBALAMIN) 500 MCG tablet Take 500 mcg by mouth Daily.    ProviderAugie MD   Evolocumab (REPATHA) solution auto-injector SureClick injection Inject 1 mL under the skin into the appropriate area as directed Every 14 (Fourteen) Days. 5/27/22 7/20/22  Laith Navarro MD        Social History:   Social History     Tobacco Use   • Smoking status: Never Smoker   • Smokeless tobacco: Current User     Types: Chew   • Tobacco comment: 1 can every 2 days   Vaping Use   • Vaping Use: Never used   Substance Use Topics   • Alcohol use: Yes     Comment: occaionally    • Drug use: Never     Recent travel: not applicable     Review of Systems:  Review of Systems   Constitutional: Negative for chills and fever.   HENT: Negative for congestion, rhinorrhea and sore throat.    Eyes: Negative for pain and visual disturbance.   Respiratory: Negative for apnea, cough, chest tightness and shortness of breath.    Cardiovascular: Positive for chest pain. Negative for palpitations.   Gastrointestinal: Negative for abdominal pain, diarrhea, nausea and vomiting.   Genitourinary: Negative for difficulty urinating and dysuria.   Musculoskeletal: Negative for joint swelling and myalgias.   Skin: Negative for color change.  "  Neurological: Positive for seizures. Negative for headaches.   Psychiatric/Behavioral: Negative.    All other systems reviewed and are negative.       Physical Exam:  /86   Pulse 80   Temp 98.2 °F (36.8 °C) (Oral)   Resp 20   Ht 172.7 cm (68\")   Wt 89.3 kg (196 lb 13.9 oz)   SpO2 96%   BMI 29.93 kg/m²     Physical Exam  Vitals and nursing note reviewed.   Constitutional:       General: He is not in acute distress.     Appearance: Normal appearance. He is not toxic-appearing.   HENT:      Head: Normocephalic and atraumatic.      Jaw: There is normal jaw occlusion.   Eyes:      General: Lids are normal.      Extraocular Movements: Extraocular movements intact.      Conjunctiva/sclera: Conjunctivae normal.      Pupils: Pupils are equal, round, and reactive to light.   Cardiovascular:      Rate and Rhythm: Normal rate and regular rhythm.      Pulses: Normal pulses.      Heart sounds: Normal heart sounds.      Comments: ECG showed normal sinus rhythm at 64 bpm, normal QRS, normal ST-segments, normal T-wave  Pulmonary:      Effort: Pulmonary effort is normal. No respiratory distress.      Breath sounds: Normal breath sounds. No wheezing or rhonchi.   Abdominal:      General: Abdomen is flat.      Palpations: Abdomen is soft.      Tenderness: There is no abdominal tenderness. There is no guarding or rebound.   Musculoskeletal:         General: Normal range of motion.      Cervical back: Normal range of motion and neck supple.      Right lower leg: No edema.      Left lower leg: No edema.   Skin:     General: Skin is warm and dry.   Neurological:      Mental Status: He is alert and oriented to person, place, and time. Mental status is at baseline.   Psychiatric:         Mood and Affect: Mood normal.                Medications in the Emergency Department:  Medications   sodium chloride 0.9 % flush 10 mL (has no administration in time range)   aspirin chewable tablet 324 mg (324 mg Oral Not Given 7/20/22 1730) "        Labs  Lab Results (last 24 hours)     Procedure Component Value Units Date/Time    POC Troponin I with Hold Tube [[id number]] Collected: 07/20/22 1600    Specimen: Blood Updated: 07/20/22 1637    Narrative:      The following orders were created for panel order POC Troponin I with Hold Tube.  Procedure                               Abnormality         Status                     ---------                               -----------         ------                     POC Troponin I[[id number]]                                                              HOLD Troponin-I Tube[[id number]]                             Final result                 Please view results for these tests on the individual orders.    CBC & Differential [[id number]]  (Abnormal) Collected: 07/20/22 1600    Specimen: Blood Updated: 07/20/22 1628    Narrative:      The following orders were created for panel order CBC & Differential.  Procedure                               Abnormality         Status                     ---------                               -----------         ------                     CBC Auto Differential[[id number]]        Abnormal            Final result                 Please view results for these tests on the individual orders.    Comprehensive Metabolic Panel [[id number]]  (Abnormal) Collected: 07/20/22 1600    Specimen: Blood Updated: 07/20/22 1654     Glucose 97 mg/dL      BUN 13 mg/dL      Creatinine 1.50 mg/dL      Sodium 139 mmol/L      Potassium 4.0 mmol/L      Chloride 99 mmol/L      CO2 29.4 mmol/L      Calcium 10.6 mg/dL      Total Protein 7.8 g/dL      Albumin 5.40 g/dL      ALT (SGPT) 53 U/L      AST (SGOT) 48 U/L      Alkaline Phosphatase 107 U/L      Total Bilirubin 1.3 mg/dL      Globulin 2.4 gm/dL      A/G Ratio 2.3 g/dL      BUN/Creatinine Ratio 8.7     Anion Gap 10.6 mmol/L      eGFR 56.0 mL/min/1.73      Comment: National Kidney Foundation and American Society of Nephrology (ASN) Task Force recommended  calculation based on the Chronic Kidney Disease Epidemiology Collaboration (CKD-EPI) equation refit without adjustment for race.       Narrative:      GFR Normal >60  Chronic Kidney Disease <60  Kidney Failure <15      Lipase [[id number]]  (Normal) Collected: 07/20/22 1600    Specimen: Blood Updated: 07/20/22 1654     Lipase 36 U/L     BNP [[id number]]  (Normal) Collected: 07/20/22 1600    Specimen: Blood Updated: 07/20/22 1708     proBNP <5.0 pg/mL     Narrative:      Among patients with dyspnea, NT-proBNP is highly sensitive for the detection of acute congestive heart failure. In addition NT-proBNP of <300 pg/ml effectively rules out acute congestive heart failure with 99% negative predictive value.    Results may be falsely decreased if patient taking Biotin.      Magnesium [[id number]]  (Normal) Collected: 07/20/22 1600    Specimen: Blood Updated: 07/20/22 1654     Magnesium 2.1 mg/dL     CK Total & CKMB [[id number]]  (Abnormal) Collected: 07/20/22 1600    Specimen: Blood Updated: 07/20/22 1654     CKMB 7.86 ng/mL      Creatine Kinase 482 U/L     Narrative:      CKMB results may be falsely decreased if patient taking Biotin.    CBC Auto Differential [[id number]]  (Abnormal) Collected: 07/20/22 1600    Specimen: Blood Updated: 07/20/22 1628     WBC 10.24 10*3/mm3      RBC 5.74 10*6/mm3      Hemoglobin 16.4 g/dL      Hematocrit 48.0 %      MCV 83.6 fL      MCH 28.6 pg      MCHC 34.2 g/dL      RDW 12.1 %      RDW-SD 36.6 fl      MPV 10.1 fL      Platelets 217 10*3/mm3      Neutrophil % 73.3 %      Lymphocyte % 19.3 %      Monocyte % 5.2 %      Eosinophil % 1.4 %      Basophil % 0.5 %      Immature Grans % 0.3 %      Neutrophils, Absolute 7.51 10*3/mm3      Lymphocytes, Absolute 1.98 10*3/mm3      Monocytes, Absolute 0.53 10*3/mm3      Eosinophils, Absolute 0.14 10*3/mm3      Basophils, Absolute 0.05 10*3/mm3      Immature Grans, Absolute 0.03 10*3/mm3      nRBC 0.0 /100 WBC     POC Troponin I [[id number]]  (Normal)  Collected: 07/20/22 1602    Specimen: Blood Updated: 07/20/22 1614     Troponin I 0.00 ng/mL      Comment: Serial Number: [id number]:  [id number]       POC Troponin I [[id number]]  (Normal) Collected: 07/20/22 1806    Specimen: Blood Updated: 07/20/22 1824     Troponin I 0.00 ng/mL      Comment: Serial Number: [id number]:  [id number]              Imaging:  XR Chest 1 View    Result Date: 7/20/2022  PROCEDURE: XR CHEST 1 VW  COMPARISON: None  INDICATIONS: CHEST PAIN  FINDINGS:  The lungs are clear bilaterally.  The cardiac and mediastinal silhouettes appear normal.  A coronary artery stent is noted.  No effusion is present.        1. Previous coronary artery stent placement       Neptali Moralez M.D.       Electronically Signed and Approved By: Neptali Moralez M.D. on 7/20/2022 at 18:40               Procedures:  Procedures    Progress  ED Course as of 07/20/22 2018 Wed Jul 20, 2022   1550 --- PROVIDER IN TRIAGE NOTE ---    The patient was evaluated my Luis Eduardo wilde in triage. Orders were placed and the patient is currently awaiting disposition. [AJ]      ED Course User Index  [AJ] Luis Eduardo Bains PA-C                            The patient was initially evaluated in the triage area where orders were placed. The patient was later dispositioned by Fransisco Arevalo MD.      Medical Decision Making:  MDM  Number of Diagnoses or Management Options  Chest pain, unspecified type  Diagnosis management comments: In summary is a 52-year-old male who presents emerged department complaining of chest tightness while running.  He is low risk heart score.  Work-up in the emergency room including CBC, CMP, troponin x2, chest x-ray, EKG are unremarkable for acute pathology.  Very strict return to ER and follow-up instructions have been provided to the patient.         Final diagnoses:   Chest pain, unspecified type        Disposition:  ED Disposition     ED Disposition   Discharge    Condition   Stable    Comment   --              This medical record created using voice recognition software.           Matt Ramirez  07/20/22 1752       Matt Ramirez  07/20/22 1815       Matt Ramirez  07/20/22 1823       Matt Ramirez  07/20/22 1840       Matt Ramirez  07/20/22 2801       Fransisco Arevalo MD  07/20/22 1609

## 2022-07-21 LAB
QT INTERVAL: 365 MS
QT INTERVAL: 365 MS

## 2022-07-27 ENCOUNTER — OFFICE VISIT (OUTPATIENT)
Dept: FAMILY MEDICINE CLINIC | Facility: CLINIC | Age: 52
End: 2022-07-27

## 2022-07-27 VITALS
HEIGHT: 68 IN | HEART RATE: 57 BPM | WEIGHT: 200 LBS | SYSTOLIC BLOOD PRESSURE: 140 MMHG | DIASTOLIC BLOOD PRESSURE: 92 MMHG | OXYGEN SATURATION: 98 % | TEMPERATURE: 98 F | BODY MASS INDEX: 30.31 KG/M2

## 2022-07-27 DIAGNOSIS — R56.9 SEIZURE: ICD-10-CM

## 2022-07-27 DIAGNOSIS — E86.0 DEHYDRATION: ICD-10-CM

## 2022-07-27 DIAGNOSIS — R07.89 OTHER CHEST PAIN: Primary | ICD-10-CM

## 2022-07-27 LAB
ANION GAP SERPL CALCULATED.3IONS-SCNC: 11 MMOL/L (ref 5–15)
BUN SERPL-MCNC: 13 MG/DL (ref 6–20)
BUN/CREAT SERPL: 9.6 (ref 7–25)
CALCIUM SPEC-SCNC: 10 MG/DL (ref 8.6–10.5)
CHLORIDE SERPL-SCNC: 107 MMOL/L (ref 98–107)
CK SERPL-CCNC: 277 U/L (ref 20–200)
CO2 SERPL-SCNC: 26 MMOL/L (ref 22–29)
CREAT SERPL-MCNC: 1.36 MG/DL (ref 0.76–1.27)
EGFRCR SERPLBLD CKD-EPI 2021: 63 ML/MIN/1.73
GLUCOSE SERPL-MCNC: 103 MG/DL (ref 65–99)
POTASSIUM SERPL-SCNC: 5.1 MMOL/L (ref 3.5–5.2)
SODIUM SERPL-SCNC: 144 MMOL/L (ref 136–145)

## 2022-07-27 PROCEDURE — 36415 COLL VENOUS BLD VENIPUNCTURE: CPT | Performed by: FAMILY MEDICINE

## 2022-07-27 PROCEDURE — 82550 ASSAY OF CK (CPK): CPT | Performed by: FAMILY MEDICINE

## 2022-07-27 PROCEDURE — 99214 OFFICE O/P EST MOD 30 MIN: CPT | Performed by: FAMILY MEDICINE

## 2022-07-27 PROCEDURE — 80048 BASIC METABOLIC PNL TOTAL CA: CPT | Performed by: FAMILY MEDICINE

## 2022-07-27 NOTE — ASSESSMENT & PLAN NOTE
His cardiac evaluation in the emergency room was negative.  He does vigorous physical activity at least 3 days a week without any precipitation.  Thus this chest pain is highly unlikely to be cardiac in nature.

## 2022-07-27 NOTE — PROGRESS NOTES
Chief Complaint   Patient presents with   • Hospital Follow Up Visit     Chest pain         Subjective     Darrick Mooney  has a past medical history of Hyperlipidemia, Hypertension, Labral tear of hip joint, Migraine headache, RLS (restless legs syndrome), and Spinal stenosis at L4-L5 level (CURRENTLY).    ER follow-up- he was to the ER Thursday of last week.  He had been having chest pain for a couple days.  He had also had a seizure the night before.  His evaluation in the emergency room was unremarkable though.    Chest pain- he states he has been having chest pain intermittently for the last few days prior to his ER presentation.  He does run about 3 days a week.  And with his physical activity he does not have any chest discomfort.  His evaluation in the emergency room including his EKG chest x-ray and 2 sets of cardiac enzymes were all negative.    Seizure- he states the night before his presentation to the emergency room he had a seizure.  He states he was in bed asleep when this occurred.  He states there is no witness of his activity.  He states he awoke and and was confused and disoriented.  He states that the first episode was about 2016.  He had attributed to taken some tramadol at the time.  He states he reduced his dosage and never had any recurrent episodes until about 2019.  He does not recall any precipitating events at that time and was not taking tramadol and is not taking tramadol currently either.  He states in these previous episodes he never brought that to the attention of any of his physicians.      PHQ-2 Depression Screening  Little interest or pleasure in doing things?     Feeling down, depressed, or hopeless?     PHQ-2 Total Score     PHQ-9 Depression Screening  Little interest or pleasure in doing things?     Feeling down, depressed, or hopeless?     Trouble falling or staying asleep, or sleeping too much?     Feeling tired or having little energy?     Poor appetite or  overeating?     Feeling bad about yourself - or that you are a failure or have let yourself or your family down?     Trouble concentrating on things, such as reading the newspaper or watching television?     Moving or speaking so slowly that other people could have noticed? Or the opposite - being so fidgety or restless that you have been moving around a lot more than usual?     Thoughts that you would be better off dead, or of hurting yourself in some way?     PHQ-9 Total Score     If you checked off any problems, how difficult have these problems made it for you to do your work, take care of things at home, or get along with other people?       No Known Allergies    Prior to Admission medications    Medication Sig Start Date End Date Taking? Authorizing Provider   aspirin (aspirin) 81 MG EC tablet Take 1 tablet by mouth Daily. 5/27/22  Yes Laith Navarro MD   atorvastatin (LIPITOR) 80 MG tablet Take 1 tablet by mouth Daily. 6/6/22  Yes Wood Martines, DO   cholecalciferol (VITAMIN D3) 25 MCG (1000 UT) tablet Take 1,000 Units by mouth Daily.   Yes Augie Rivera MD   Ginkgo Biloba (GINKOBA PO) Take  by mouth.   Yes Emergency, Nurse Epic, RN   Multiple Vitamins-Minerals (Multivitamin Adult, Minerals,) tablet Take 1 tablet by mouth Daily.   Yes Augie Rivera MD   Synthroid 112 MCG tablet Take 1 tablet by mouth Daily. 11/19/21  Yes Wood Martines, DO   ticagrelor (BRILINTA) 90 MG tablet tablet Take 1 tablet by mouth 2 (Two) Times a Day. 5/27/22  Yes Laith Navarro MD   vitamin B-12 (CYANOCOBALAMIN) 500 MCG tablet Take 500 mcg by mouth Daily.   Yes Augie Rivera MD        Patient Active Problem List   Diagnosis   • Hyperlipidemia   • RLS (restless legs syndrome)   • Acquired hypothyroidism   • Sciatic leg pain   • Benign prostatic hyperplasia without lower urinary tract symptoms   • Smokeless tobacco use   • Migraine headache   • Labral tear of hip joint   • Spinal  "stenosis of lumbar region with neurogenic claudication   • NSTEMI (non-ST elevated myocardial infarction) (HCC)   • Contusion of right ring finger without damage to nail   • Other chest pain   • Seizure (HCC)   • Dehydration        Past Surgical History:   Procedure Laterality Date   • APPENDECTOMY     • COLONOSCOPY  06/05/2011   • ELBOW ARTHROPLASTY Left 2014   • NOSE SURGERY  2002    deviated septum       Social History     Socioeconomic History   • Marital status:    Tobacco Use   • Smoking status: Never Smoker   • Smokeless tobacco: Current User     Types: Chew   • Tobacco comment: 1 can every 2 days   Vaping Use   • Vaping Use: Never used   Substance and Sexual Activity   • Alcohol use: Yes     Comment: occaionally    • Drug use: Never   • Sexual activity: Defer       Family History   Problem Relation Age of Onset   • Heart disease Mother    • Heart disease Maternal Grandmother    • Diabetes Maternal Grandmother         mellitus   • Hypertension Maternal Grandmother    • Cancer Paternal Grandmother    • Cancer Other    • Heart disease Other    • Heart disease Other        Family history, surgical history, past medical history, Allergies and meds reviewed with patient today and updated in [facility] EMR.     ROS:  Review of Systems   Constitutional: Positive for fatigue.   Respiratory: Negative for cough, chest tightness, shortness of breath and wheezing.    Cardiovascular: Positive for chest pain. Negative for palpitations.   Neurological: Positive for seizures and headache.       OBJECTIVE:  Vitals:    07/27/22 0859   BP: 140/92   BP Location: Right arm   Patient Position: Sitting   Pulse: 57   Temp: 98 °F (36.7 °C)   SpO2: 98%   Weight: 90.7 kg (200 lb)   Height: 172.7 cm (68\")     No exam data present   Body mass index is 30.41 kg/m².  No LMP for male patient.    Physical Exam  Constitutional:       General: He is not in acute distress.     Appearance: Normal appearance.   HENT:      Head: Normocephalic.    "   Right Ear: Tympanic membrane, ear canal and external ear normal.      Left Ear: Tympanic membrane, ear canal and external ear normal.      Nose: Nose normal.      Mouth/Throat:      Mouth: Mucous membranes are moist.      Pharynx: Oropharynx is clear.   Eyes:      General: No scleral icterus.     Conjunctiva/sclera: Conjunctivae normal.      Pupils: Pupils are equal, round, and reactive to light.   Cardiovascular:      Rate and Rhythm: Normal rate and regular rhythm.      Pulses: Normal pulses.      Heart sounds: Normal heart sounds. No murmur heard.  Pulmonary:      Effort: Pulmonary effort is normal.      Breath sounds: Normal breath sounds. No wheezing, rhonchi or rales.   Musculoskeletal:      Cervical back: Neck supple. No rigidity or tenderness.   Lymphadenopathy:      Cervical: No cervical adenopathy.   Skin:     General: Skin is warm and dry.      Coloration: Skin is not jaundiced.      Findings: No rash.   Neurological:      General: No focal deficit present.      Mental Status: He is alert and oriented to person, place, and time.   Psychiatric:         Mood and Affect: Mood normal.         Thought Content: Thought content normal.         Judgment: Judgment normal.         Procedures    Admission on 07/20/2022, Discharged on 07/20/2022   Component Date Value Ref Range Status   • QT Interval 07/20/2022 365  ms Final   • QT Interval 07/20/2022 365  ms Final   • Glucose 07/20/2022 97  65 - 99 mg/dL Final   • BUN 07/20/2022 13  6 - 20 mg/dL Final   • Creatinine 07/20/2022 1.50 (A) 0.76 - 1.27 mg/dL Final   • Sodium 07/20/2022 139  136 - 145 mmol/L Final   • Potassium 07/20/2022 4.0  3.5 - 5.2 mmol/L Final   • Chloride 07/20/2022 99  98 - 107 mmol/L Final   • CO2 07/20/2022 29.4 (A) 22.0 - 29.0 mmol/L Final   • Calcium 07/20/2022 10.6 (A) 8.6 - 10.5 mg/dL Final   • Total Protein 07/20/2022 7.8  6.0 - 8.5 g/dL Final   • Albumin 07/20/2022 5.40 (A) 3.50 - 5.20 g/dL Final   • ALT (SGPT) 07/20/2022 53 (A) 1 - 41  U/L Final   • AST (SGOT) 07/20/2022 48 (A) 1 - 40 U/L Final   • Alkaline Phosphatase 07/20/2022 107  39 - 117 U/L Final   • Total Bilirubin 07/20/2022 1.3 (A) 0.0 - 1.2 mg/dL Final   • Globulin 07/20/2022 2.4  gm/dL Final   • A/G Ratio 07/20/2022 2.3  g/dL Final   • BUN/Creatinine Ratio 07/20/2022 8.7  7.0 - 25.0 Final   • Anion Gap 07/20/2022 10.6  5.0 - 15.0 mmol/L Final   • eGFR 07/20/2022 56.0 (A) >60.0 mL/min/1.73 Final    National Kidney Foundation and American Society of Nephrology (ASN) Task Force recommended calculation based on the Chronic Kidney Disease Epidemiology Collaboration (CKD-EPI) equation refit without adjustment for race.   • Lipase 07/20/2022 36  13 - 60 U/L Final   • proBNP 07/20/2022 <5.0  0.0 - 900.0 pg/mL Final   • Magnesium 07/20/2022 2.1  1.6 - 2.6 mg/dL Final   • CKMB 07/20/2022 7.86  <=10.40 ng/mL Final   • Creatine Kinase 07/20/2022 482 (A) 20 - 200 U/L Final   • Extra Tube 07/20/2022 Hold for add-ons.   Final    Auto resulted.   • Extra Tube 07/20/2022 hold for add-on   Final    Auto resulted   • Extra Tube 07/20/2022 Hold for add-ons.   Final    Auto resulted.   • Extra Tube 07/20/2022 Hold for add-ons.   Final    Auto resulted   • Extra Tube 07/20/2022 Hold for add-ons.   Final    Auto resulted.   • WBC 07/20/2022 10.24  3.40 - 10.80 10*3/mm3 Final   • RBC 07/20/2022 5.74  4.14 - 5.80 10*6/mm3 Final   • Hemoglobin 07/20/2022 16.4  13.0 - 17.7 g/dL Final   • Hematocrit 07/20/2022 48.0  37.5 - 51.0 % Final   • MCV 07/20/2022 83.6  79.0 - 97.0 fL Final   • MCH 07/20/2022 28.6  26.6 - 33.0 pg Final   • MCHC 07/20/2022 34.2  31.5 - 35.7 g/dL Final   • RDW 07/20/2022 12.1 (A) 12.3 - 15.4 % Final   • RDW-SD 07/20/2022 36.6 (A) 37.0 - 54.0 fl Final   • MPV 07/20/2022 10.1  6.0 - 12.0 fL Final   • Platelets 07/20/2022 217  140 - 450 10*3/mm3 Final   • Neutrophil % 07/20/2022 73.3  42.7 - 76.0 % Final   • Lymphocyte % 07/20/2022 19.3 (A) 19.6 - 45.3 % Final   • Monocyte % 07/20/2022 5.2  5.0  - 12.0 % Final   • Eosinophil % 07/20/2022 1.4  0.3 - 6.2 % Final   • Basophil % 07/20/2022 0.5  0.0 - 1.5 % Final   • Immature Grans % 07/20/2022 0.3  0.0 - 0.5 % Final   • Neutrophils, Absolute 07/20/2022 7.51 (A) 1.70 - 7.00 10*3/mm3 Final   • Lymphocytes, Absolute 07/20/2022 1.98  0.70 - 3.10 10*3/mm3 Final   • Monocytes, Absolute 07/20/2022 0.53  0.10 - 0.90 10*3/mm3 Final   • Eosinophils, Absolute 07/20/2022 0.14  0.00 - 0.40 10*3/mm3 Final   • Basophils, Absolute 07/20/2022 0.05  0.00 - 0.20 10*3/mm3 Final   • Immature Grans, Absolute 07/20/2022 0.03  0.00 - 0.05 10*3/mm3 Final   • nRBC 07/20/2022 0.0  0.0 - 0.2 /100 WBC Final   • Troponin I 07/20/2022 0.00  0.00 - 0.60 ng/mL Final    Serial Number: [id number]:  [id number]   • Troponin I 07/20/2022 0.00  0.00 - 0.60 ng/mL Final    Serial Number: [id number]:  [id number]   Admission on 07/20/2022, Discharged on 07/20/2022   Component Date Value Ref Range Status   • QT Interval 07/20/2022 374  ms Final       ASSESSMENT/ PLAN:    Diagnoses and all orders for this visit:    1. Other chest pain (Primary)  Assessment & Plan:  His cardiac evaluation in the emergency room was negative.  He does vigorous physical activity at least 3 days a week without any precipitation.  Thus this chest pain is highly unlikely to be cardiac in nature.    Orders:  -     CK    2. Seizure (HCC)  Assessment & Plan:  These episodes that he has had have always been unwitnessed.  I am not certain these are absolute true seizures or some other phenomenon.  These episodes always occur in his sleep and awakens confused and disoriented and anxious.  Rather than a seizure could be some type of abnormal sleep phenomenon though.  He does have obstructive sleep apnea and is extremely compliant though with his CPAP.  We will have him see neurology for further evaluation.    Orders:  -     Ambulatory Referral to Neurology  -     CK    3. Dehydration  Assessment & Plan:  His labs in the emergency room  suggested some dehydration.  We will go ahead and repeat his BMP to make sure that has normalized.    Orders:  -     Basic Metabolic Panel  -     CK      Orders Placed Today:     No orders of the defined types were placed in this encounter.       Management Plan:     An After Visit Summary was printed and given to the patient at discharge.    Follow-up: Return in about 3 months (around 10/27/2022) for Recheck.    Wood Martines DO 7/27/2022 09:38 EDT  This note was electronically signed.

## 2022-07-27 NOTE — ASSESSMENT & PLAN NOTE
His labs in the emergency room suggested some dehydration.  We will go ahead and repeat his BMP to make sure that has normalized.

## 2022-07-27 NOTE — ASSESSMENT & PLAN NOTE
These episodes that he has had have always been unwitnessed.  I am not certain these are absolute true seizures or some other phenomenon.  These episodes always occur in his sleep and awakens confused and disoriented and anxious.  Rather than a seizure could be some type of abnormal sleep phenomenon though.  He does have obstructive sleep apnea and is extremely compliant though with his CPAP.  We will have him see neurology for further evaluation.

## 2022-07-28 DIAGNOSIS — E86.0 DEHYDRATION: ICD-10-CM

## 2022-07-28 DIAGNOSIS — R56.9 SEIZURE: ICD-10-CM

## 2022-07-28 DIAGNOSIS — R07.9 CHEST PAIN, UNSPECIFIED TYPE: Primary | ICD-10-CM

## 2022-07-28 RX ORDER — ROSUVASTATIN CALCIUM 20 MG/1
20 TABLET, COATED ORAL DAILY
Qty: 90 TABLET | Refills: 3 | Status: SHIPPED | OUTPATIENT
Start: 2022-07-28 | End: 2023-03-03 | Stop reason: SDUPTHER

## 2022-08-03 ENCOUNTER — LAB (OUTPATIENT)
Dept: LAB | Facility: HOSPITAL | Age: 52
End: 2022-08-03

## 2022-08-03 DIAGNOSIS — E86.0 DEHYDRATION: ICD-10-CM

## 2022-08-03 DIAGNOSIS — R07.9 CHEST PAIN, UNSPECIFIED TYPE: ICD-10-CM

## 2022-08-03 DIAGNOSIS — R56.9 SEIZURE: ICD-10-CM

## 2022-08-03 LAB
ANION GAP SERPL CALCULATED.3IONS-SCNC: 9.8 MMOL/L (ref 5–15)
BUN SERPL-MCNC: 13 MG/DL (ref 6–20)
BUN/CREAT SERPL: 9.3 (ref 7–25)
CALCIUM SPEC-SCNC: 9.6 MG/DL (ref 8.6–10.5)
CHLORIDE SERPL-SCNC: 107 MMOL/L (ref 98–107)
CK SERPL-CCNC: 219 U/L (ref 20–200)
CO2 SERPL-SCNC: 25.2 MMOL/L (ref 22–29)
CREAT SERPL-MCNC: 1.4 MG/DL (ref 0.76–1.27)
EGFRCR SERPLBLD CKD-EPI 2021: 60.9 ML/MIN/1.73
GLUCOSE SERPL-MCNC: 99 MG/DL (ref 65–99)
POTASSIUM SERPL-SCNC: 4.6 MMOL/L (ref 3.5–5.2)
SODIUM SERPL-SCNC: 142 MMOL/L (ref 136–145)

## 2022-08-03 PROCEDURE — 82550 ASSAY OF CK (CPK): CPT

## 2022-08-03 PROCEDURE — 80048 BASIC METABOLIC PNL TOTAL CA: CPT

## 2022-08-03 PROCEDURE — 36415 COLL VENOUS BLD VENIPUNCTURE: CPT

## 2022-08-22 DIAGNOSIS — E03.9 ACQUIRED HYPOTHYROIDISM: ICD-10-CM

## 2022-08-22 RX ORDER — LEVOTHYROXINE SODIUM 112 MCG
112 TABLET ORAL DAILY
Qty: 180 TABLET | Refills: 0 | Status: SHIPPED | OUTPATIENT
Start: 2022-08-22 | End: 2022-08-22 | Stop reason: SDUPTHER

## 2022-08-22 RX ORDER — LEVOTHYROXINE SODIUM 112 MCG
112 TABLET ORAL DAILY
Qty: 90 TABLET | Refills: 3 | Status: SHIPPED | OUTPATIENT
Start: 2022-08-22 | End: 2023-02-01

## 2022-08-22 NOTE — TELEPHONE ENCOUNTER
Caller: Darrick Mooney    Relationship: Self    Best call back number: [phone]     Requested Prescriptions:   Requested Prescriptions     Pending Prescriptions Disp Refills   • Synthroid 112 MCG tablet 180 tablet 0     Sig: Take 1 tablet by mouth Daily.        Pharmacy where request should be sent: [facility] [place], [place] - [address] - [phone]  - [phone]     Additional details provided by patient: PLEASE CALL WHEN PRESCRIPTION HAS BEEN SENT    Does the patient have less than a 3 day supply:  [] Yes  [x] No    Mary Kay SALCIDO Rep   08/22/22 13:49 EDT

## 2022-10-11 ENCOUNTER — OFFICE VISIT (OUTPATIENT)
Dept: NEUROLOGY | Facility: CLINIC | Age: 52
End: 2022-10-11

## 2022-10-11 VITALS
HEIGHT: 68 IN | SYSTOLIC BLOOD PRESSURE: 132 MMHG | WEIGHT: 202.2 LBS | BODY MASS INDEX: 30.65 KG/M2 | HEART RATE: 55 BPM | DIASTOLIC BLOOD PRESSURE: 83 MMHG

## 2022-10-11 DIAGNOSIS — R56.9 SEIZURE-LIKE ACTIVITY: Primary | ICD-10-CM

## 2022-10-11 PROCEDURE — 99214 OFFICE O/P EST MOD 30 MIN: CPT | Performed by: NURSE PRACTITIONER

## 2022-10-11 NOTE — PROGRESS NOTES
"Chief Complaint  Neurologic Problem    Subjective          Darrick Mooney presents to [facility]  History of Present Illness  States he is experiencing spells that occur in the middle of the night.  He will wake up, feel confused and disoriented, then start shaking all over.  Denies losing consciousness with the spell.  Spell will last 15-20 seconds.  Right afterwards he feels normal and is able to fall back asleep quickly. First spell was in 2016.  Has had 3 total spells. Last spell was about 4 months ago.        Objective   Vital Signs:   /83   Pulse 55   Ht 172.7 cm (68\")   Wt 91.7 kg (202 lb 3.2 oz)   BMI 30.74 kg/m²     Physical Exam  HENT:      Head: Normocephalic.   Pulmonary:      Effort: Pulmonary effort is normal.   Neurological:      Mental Status: He is alert and oriented to person, place, and time.      Cranial Nerves: Cranial nerves are intact.      Sensory: Sensation is intact.      Motor: Motor function is intact.      Coordination: Coordination is intact.      Deep Tendon Reflexes: Reflexes are normal and symmetric.        Neurologic Exam     Mental Status   Oriented to person, place, and time.        Result Review :               Assessment and Plan    Diagnoses and all orders for this visit:    1. Seizure-like activity (HCC) (Primary)  Assessment & Plan:  Will order MRI brain and EEG to further evaluate seizure-like activity.  Panic induced spells also in the differential. Could consider sleep study in the future.  Discussed routine seizure precautions including, but not limited to, avoiding bathing alone, swimming alone, climbing on ladders.  Also discussed need for medication compliance and risks of unmanaged epilepsy including status epilepticus, permanent brain injury or potentially death.  Patient is aware of [place] state law regarding no driving for 90 days following a seizure.      Orders:  -     EEG (Hospital Performed); Future  -     " MRI Brain With & Without Contrast; Future      Follow Up   Return in about 2 months (around 12/11/2022) for seizure f/u.  Patient was given instructions and counseling regarding his condition or for health maintenance advice. Please see specific information pulled into the AVS if appropriate.

## 2022-10-12 ENCOUNTER — TELEPHONE (OUTPATIENT)
Dept: FAMILY MEDICINE CLINIC | Facility: CLINIC | Age: 52
End: 2022-10-12

## 2022-10-12 NOTE — TELEPHONE ENCOUNTER
Caller: Darrick Mooeny    Relationship: Self    Best call back number: [phone]    Who are you requesting to speak with (clinical staff, provider,  specific staff member): MEDICAL STAFF    What was the call regarding: PATIENT WOULD LIKE CHOLESTEROL, CORTISOL AND THYROID ADDED TO HIS LAB ORDER. PLEASE CALL PATIENT WHEN THEY HAVE BEEN ADDED.

## 2022-10-14 ENCOUNTER — PATIENT ROUNDING (BHMG ONLY) (OUTPATIENT)
Dept: NEUROSURGERY | Facility: CLINIC | Age: 52
End: 2022-10-14

## 2022-10-14 DIAGNOSIS — E78.5 HYPERLIPIDEMIA, UNSPECIFIED HYPERLIPIDEMIA TYPE: ICD-10-CM

## 2022-10-14 DIAGNOSIS — I21.4 NSTEMI (NON-ST ELEVATED MYOCARDIAL INFARCTION): Primary | ICD-10-CM

## 2022-10-14 DIAGNOSIS — E03.9 ACQUIRED HYPOTHYROIDISM: ICD-10-CM

## 2022-10-14 NOTE — ASSESSMENT & PLAN NOTE
Will order MRI brain and EEG to further evaluate seizure-like activity.  Panic induced spells also in the differential. Could consider sleep study in the future.  Discussed routine seizure precautions including, but not limited to, avoiding bathing alone, swimming alone, climbing on ladders.  Also discussed need for medication compliance and risks of unmanaged epilepsy including status epilepticus, permanent brain injury or potentially death.  Patient is aware of [place] state law regarding no driving for 90 days following a seizure.

## 2022-10-14 NOTE — PROGRESS NOTES
Luis, My name is Rebecca     I am  The Practice Manager with [facility] and want to officially welcome you to our practice and ask about your recent visit.    Tell me about your visit with us. What things went well?         We're always looking for ways to make our patients' experiences even better. Do you have recommendations on ways we may improve?      Overall were you satisfied with your first visit to our practice?      I appreciate you taking the time to answer these question. Is there anything else I can do for you?     You may also receive a brief survey via e-mail or mail from [employer] about our patient satisfaction, if you could please take a moment and answer it would be helpful to the practice growth.    Thank you, and have a great day.    Rebecca

## 2022-11-01 ENCOUNTER — LAB (OUTPATIENT)
Dept: LAB | Facility: HOSPITAL | Age: 52
End: 2022-11-01

## 2022-11-01 ENCOUNTER — HOSPITAL ENCOUNTER (OUTPATIENT)
Dept: NEUROLOGY | Facility: HOSPITAL | Age: 52
Discharge: HOME | End: 2022-11-01

## 2022-11-01 DIAGNOSIS — R56.9 SEIZURE-LIKE ACTIVITY: ICD-10-CM

## 2022-11-01 DIAGNOSIS — E03.9 ACQUIRED HYPOTHYROIDISM: ICD-10-CM

## 2022-11-01 LAB
ALBUMIN SERPL-MCNC: 4.6 G/DL (ref 3.5–5.2)
ALBUMIN/GLOB SERPL: 2.3 G/DL
ALP SERPL-CCNC: 81 U/L (ref 39–117)
ALT SERPL W P-5'-P-CCNC: 44 U/L (ref 1–41)
ANION GAP SERPL CALCULATED.3IONS-SCNC: 11.2 MMOL/L (ref 5–15)
AST SERPL-CCNC: 36 U/L (ref 1–40)
BILIRUB SERPL-MCNC: 0.9 MG/DL (ref 0–1.2)
BUN SERPL-MCNC: 19 MG/DL (ref 6–20)
BUN/CREAT SERPL: 14.6 (ref 7–25)
CALCIUM SPEC-SCNC: 9.3 MG/DL (ref 8.6–10.5)
CHLORIDE SERPL-SCNC: 105 MMOL/L (ref 98–107)
CHOLEST SERPL-MCNC: 113 MG/DL (ref 0–200)
CO2 SERPL-SCNC: 26.8 MMOL/L (ref 22–29)
CORTIS SERPL-MCNC: 7.46 MCG/DL
CREAT SERPL-MCNC: 1.3 MG/DL (ref 0.76–1.27)
EGFRCR SERPLBLD CKD-EPI 2021: 66.5 ML/MIN/1.73
GLOBULIN UR ELPH-MCNC: 2 GM/DL
GLUCOSE SERPL-MCNC: 102 MG/DL (ref 65–99)
HDLC SERPL-MCNC: 45 MG/DL (ref 40–60)
LDLC SERPL CALC-MCNC: 49 MG/DL (ref 0–100)
LDLC/HDLC SERPL: 1.05 {RATIO}
POTASSIUM SERPL-SCNC: 4.6 MMOL/L (ref 3.5–5.2)
PROT SERPL-MCNC: 6.6 G/DL (ref 6–8.5)
SODIUM SERPL-SCNC: 143 MMOL/L (ref 136–145)
T4 FREE SERPL-MCNC: 1.18 NG/DL (ref 0.93–1.7)
TRIGL SERPL-MCNC: 103 MG/DL (ref 0–150)
TSH SERPL DL<=0.05 MIU/L-ACNC: 5.92 UIU/ML (ref 0.27–4.2)
VLDLC SERPL-MCNC: 19 MG/DL (ref 5–40)

## 2022-11-01 PROCEDURE — 95816 EEG AWAKE AND DROWSY: CPT | Performed by: PSYCHIATRY & NEUROLOGY

## 2022-11-01 PROCEDURE — 36415 COLL VENOUS BLD VENIPUNCTURE: CPT | Performed by: FAMILY MEDICINE

## 2022-11-01 PROCEDURE — 84443 ASSAY THYROID STIM HORMONE: CPT | Performed by: FAMILY MEDICINE

## 2022-11-01 PROCEDURE — 80061 LIPID PANEL: CPT | Performed by: FAMILY MEDICINE

## 2022-11-01 PROCEDURE — 82533 TOTAL CORTISOL: CPT

## 2022-11-01 PROCEDURE — 80053 COMPREHEN METABOLIC PANEL: CPT | Performed by: FAMILY MEDICINE

## 2022-11-01 PROCEDURE — 95816 EEG AWAKE AND DROWSY: CPT

## 2022-11-01 PROCEDURE — 84439 ASSAY OF FREE THYROXINE: CPT | Performed by: FAMILY MEDICINE

## 2022-11-02 ENCOUNTER — HOSPITAL ENCOUNTER (OUTPATIENT)
Dept: MRI IMAGING | Facility: HOSPITAL | Age: 52
Discharge: HOME | End: 2022-11-02
Admitting: NURSE PRACTITIONER

## 2022-11-02 DIAGNOSIS — R56.9 SEIZURE-LIKE ACTIVITY: ICD-10-CM

## 2022-11-02 PROCEDURE — 0 GADOBENATE DIMEGLUMINE 529 MG/ML SOLUTION: Performed by: NURSE PRACTITIONER

## 2022-11-02 PROCEDURE — A9577 INJ MULTIHANCE: HCPCS | Performed by: NURSE PRACTITIONER

## 2022-11-02 PROCEDURE — 70553 MRI BRAIN STEM W/O & W/DYE: CPT

## 2022-11-02 RX ADMIN — GADOBENATE DIMEGLUMINE 20 ML: 529 INJECTION, SOLUTION INTRAVENOUS at 18:17

## 2022-11-03 DIAGNOSIS — R79.89 ELEVATED TSH: Primary | ICD-10-CM

## 2023-01-04 ENCOUNTER — OFFICE VISIT (OUTPATIENT)
Dept: NEUROLOGY | Facility: CLINIC | Age: 53
End: 2023-01-04
Payer: OTHER GOVERNMENT

## 2023-01-04 VITALS
HEART RATE: 67 BPM | HEIGHT: 68 IN | DIASTOLIC BLOOD PRESSURE: 87 MMHG | SYSTOLIC BLOOD PRESSURE: 128 MMHG | WEIGHT: 208.2 LBS | BODY MASS INDEX: 31.55 KG/M2

## 2023-01-04 DIAGNOSIS — G47.33 OSA (OBSTRUCTIVE SLEEP APNEA): Primary | ICD-10-CM

## 2023-01-04 PROCEDURE — 99214 OFFICE O/P EST MOD 30 MIN: CPT | Performed by: NURSE PRACTITIONER

## 2023-01-04 NOTE — PROGRESS NOTES
Chief Complaint  Neurologic Problem    Subjective          Darrick Mooney presents to [facility]  History of Present Illness  States he hasn't had any other episodes of convulsions since previous visit.  States he has woken up confused in the middle of the night several times.  Endorses snoring.  States he has a CPAP that he's compliant with.      Interval History:  States he is experiencing spells that occur in the middle of the night.  He will wake up, feel confused and disoriented, then start shaking all over.  Denies losing consciousness with the spell.  Spell will last 15-20 seconds.  Right afterwards he feels normal and is able to fall back asleep quickly. First spell was in 2016.  Has had 3 total spells. Last spell was about 4 months ago.        Objective   Vital Signs:   /87   Pulse 67   Ht 172.7 cm (68\")   Wt 94.4 kg (208 lb 3.2 oz)   BMI 31.66 kg/m²     Physical Exam  HENT:      Head: Normocephalic.   Pulmonary:      Effort: Pulmonary effort is normal.   Neurological:      Mental Status: He is alert and oriented to person, place, and time.      Sensory: Sensation is intact.      Motor: Motor function is intact.      Coordination: Coordination is intact.      Deep Tendon Reflexes: Reflexes are normal and symmetric.        Neurologic Exam     Mental Status   Oriented to person, place, and time.        Result Review :            MRI Brain: Normal     EEG: Normal      Assessment and Plan    Diagnoses and all orders for this visit:    1. ODESSA (obstructive sleep apnea) (Primary)  Assessment & Plan:  Waking up confused in the middle of the night.  Uses CPAP but no routine monitoring from sleep med.  Will refer to sleep med for evaluation.  MRI brain and EEG unrevealing.     Orders:  -     Ambulatory Referral to Sleep Medicine      Follow Up   Return if symptoms worsen or fail to improve.  Patient was given instructions and counseling regarding his  condition or for health maintenance advice. Please see specific information pulled into the AVS if appropriate.

## 2023-01-06 PROBLEM — G47.33 OSA (OBSTRUCTIVE SLEEP APNEA): Status: ACTIVE | Noted: 2023-01-06

## 2023-01-31 ENCOUNTER — OFFICE VISIT (OUTPATIENT)
Dept: FAMILY MEDICINE CLINIC | Facility: CLINIC | Age: 53
End: 2023-01-31
Payer: OTHER GOVERNMENT

## 2023-01-31 VITALS
DIASTOLIC BLOOD PRESSURE: 83 MMHG | WEIGHT: 208.8 LBS | OXYGEN SATURATION: 99 % | HEART RATE: 63 BPM | HEIGHT: 68 IN | BODY MASS INDEX: 31.64 KG/M2 | TEMPERATURE: 97.3 F | SYSTOLIC BLOOD PRESSURE: 134 MMHG

## 2023-01-31 DIAGNOSIS — R53.82 CHRONIC FATIGUE: ICD-10-CM

## 2023-01-31 DIAGNOSIS — E03.9 ACQUIRED HYPOTHYROIDISM: ICD-10-CM

## 2023-01-31 DIAGNOSIS — R68.82 DECREASED LIBIDO: ICD-10-CM

## 2023-01-31 DIAGNOSIS — I21.4 NSTEMI (NON-ST ELEVATED MYOCARDIAL INFARCTION): ICD-10-CM

## 2023-01-31 DIAGNOSIS — E78.5 HYPERLIPIDEMIA, UNSPECIFIED HYPERLIPIDEMIA TYPE: Primary | ICD-10-CM

## 2023-01-31 PROCEDURE — 99214 OFFICE O/P EST MOD 30 MIN: CPT | Performed by: FAMILY MEDICINE

## 2023-01-31 NOTE — PROGRESS NOTES
Chief Complaint   Patient presents with   • Follow-up     6 month    • Hyperlipidemia   • Hypothyroidism        Subjective     Darrick Mooney  has a past medical history of Heart attack (HCC), Hyperlipidemia, Hypertension, Labral tear of hip joint, Migraine headache, RLS (restless legs syndrome), and Spinal stenosis at L4-L5 level (CURRENTLY).    Hypothyroid- he takes his thyroid medicine every morning as directed.  He has noticed that he has had some weight gain, cold intolerance, and dry skin.    Hyperlipidemia- he is taking his rosuvastatin on a daily basis.  His lipid profile 3 months ago was excellent.    CAD- he has some intermittent chest discomfort that occurs more with laying flat.  He continues to workout vigorously on a regular basis without any chest discomfort though.      PHQ-2 Depression Screening  Little interest or pleasure in doing things?     Feeling down, depressed, or hopeless?     PHQ-2 Total Score     PHQ-9 Depression Screening  Little interest or pleasure in doing things?     Feeling down, depressed, or hopeless?     Trouble falling or staying asleep, or sleeping too much?     Feeling tired or having little energy?     Poor appetite or overeating?     Feeling bad about yourself - or that you are a failure or have let yourself or your family down?     Trouble concentrating on things, such as reading the newspaper or watching television?     Moving or speaking so slowly that other people could have noticed? Or the opposite - being so fidgety or restless that you have been moving around a lot more than usual?     Thoughts that you would be better off dead, or of hurting yourself in some way?     PHQ-9 Total Score     If you checked off any problems, how difficult have these problems made it for you to do your work, take care of things at home, or get along with other people?       No Known Allergies    Prior to Admission medications    Medication Sig Start Date End Date Taking?  Authorizing Provider   aspirin (aspirin) 81 MG EC tablet Take 1 tablet by mouth Daily. 5/27/22  Yes Laith Navarro MD   cholecalciferol (VITAMIN D3) 25 MCG (1000 UT) tablet Take 1,000 Units by mouth Daily.   Yes Provider, MD Augie   Ginkgo Biloba (GINKOBA PO) Take  by mouth.   Yes Emergency, Nurse Epic, RN   Probiotic Product (PROBIOTIC-10 PO) Take  by mouth.   Yes Provider, MD Augie   rosuvastatin (CRESTOR) 20 MG tablet Take 1 tablet by mouth Daily. 7/28/22  Yes Laith Navarro MD   Synthroid 112 MCG tablet Take 1 tablet by mouth Daily for 90 days. 8/22/22 1/31/23 Yes Wood Martines DO   ticagrelor (BRILINTA) 90 MG tablet tablet Take 1 tablet by mouth 2 (Two) Times a Day. 5/27/22  Yes Laith Navarro MD        Patient Active Problem List   Diagnosis   • Hyperlipidemia   • RLS (restless legs syndrome)   • Acquired hypothyroidism   • Sciatic leg pain   • Benign prostatic hyperplasia without lower urinary tract symptoms   • Smokeless tobacco use   • Migraine headache   • Labral tear of hip joint   • Spinal stenosis of lumbar region with neurogenic claudication   • NSTEMI (non-ST elevated myocardial infarction) ([facility])   • Contusion of right ring finger without damage to nail   • Other chest pain   • Seizure-like activity ([facility])   • Dehydration   • ODESSA (obstructive sleep apnea)   • Decreased libido   • Chronic fatigue        Past Surgical History:   Procedure Laterality Date   • APPENDECTOMY     • COLONOSCOPY  06/05/2011   • CORONARY STENT PLACEMENT     • ELBOW ARTHROPLASTY Left 2014   • NOSE SURGERY  2002    deviated septum       Social History     Socioeconomic History   • Marital status:    Tobacco Use   • Smoking status: Never   • Smokeless tobacco: Current     Types: Chew   • Tobacco comments:     1 can every 2 days   Vaping Use   • Vaping Use: Never used   Substance and Sexual Activity   • Alcohol use: Yes     Comment: occaionally    • Drug use: Never   • Sexual activity:  "Defer       Family History   Problem Relation Age of Onset   • Heart disease Mother    • Heart disease Maternal Grandmother    • Diabetes Maternal Grandmother         mellitus   • Hypertension Maternal Grandmother    • Cancer Paternal Grandmother    • Cancer Other    • Heart disease Other    • Heart disease Other        Family history, surgical history, past medical history, Allergies and meds reviewed with patient today and updated in [facility] EMR.     ROS:  Review of Systems   Constitutional: Positive for fatigue and unexpected weight gain.   Respiratory: Negative for cough, chest tightness, shortness of breath and wheezing.    Cardiovascular: Negative for chest pain and palpitations.   Endocrine: Positive for cold intolerance.   Genitourinary: Positive for decreased libido.   Psychiatric/Behavioral: Negative for depressed mood. The patient is not nervous/anxious.        OBJECTIVE:  Vitals:    01/31/23 1540   BP: 134/83   BP Location: Left arm   Patient Position: Sitting   Pulse: 63   Temp: 97.3 °F (36.3 °C)   SpO2: 99%   Weight: 94.7 kg (208 lb 12.8 oz)   Height: 172.7 cm (68\")     No results found.   Body mass index is 31.75 kg/m².  No LMP for male patient.    Physical Exam  Vitals and nursing note reviewed.   Constitutional:       General: He is not in acute distress.     Appearance: Normal appearance. He is obese.   HENT:      Head: Normocephalic.   Cardiovascular:      Rate and Rhythm: Normal rate and regular rhythm.      Heart sounds: Normal heart sounds. No murmur heard.  Pulmonary:      Effort: Pulmonary effort is normal.      Breath sounds: Normal breath sounds. No wheezing.   Neurological:      Mental Status: He is alert and oriented to person, place, and time.   Psychiatric:         Mood and Affect: Mood normal.         Behavior: Behavior normal.         Thought Content: Thought content normal.         Judgment: Judgment normal.         Procedures    No visits with results within 30 Day(s) from this visit. "   Latest known visit with results is:   Lab on 11/01/2022   Component Date Value Ref Range Status   • Cortisol 11/01/2022 7.46    mcg/dL Final       ASSESSMENT/ PLAN:    Diagnoses and all orders for this visit:    1. Hyperlipidemia, unspecified hyperlipidemia type (Primary)  Assessment & Plan:  His most recent lipid profile was good.  We will not repeat that today.    Orders:  -     TSH+Free T4    2. Acquired hypothyroidism  Assessment & Plan:  We will update his thyroid profile and adjust it if needed based upon these results.    Orders:  -     TSH+Free T4    3. NSTEMI (non-ST elevated myocardial infarction) (HCC)  Assessment & Plan:  His chest discomfort he has is somewhat atypical for angina.  He does have a follow-up with cardiology in a little over a month.      4. Decreased libido  -     Testosterone, Free, Total  -     TSH+Free T4    5. Chronic fatigue  -     Testosterone, Free, Total  -     TSH+Free T4  -     CBC (No Diff)  -     Vitamin B12  -     Iron Profile      Orders Placed Today:     No orders of the defined types were placed in this encounter.       Management Plan:     An After Visit Summary was printed and given to the patient at discharge.    Follow-up: No follow-ups on file.    Wood Martines,  1/31/2023 16:37 EST  This note was electronically signed.

## 2023-01-31 NOTE — ASSESSMENT & PLAN NOTE
His chest discomfort he has is somewhat atypical for angina.  He does have a follow-up with cardiology in a little over a month.

## 2023-02-01 ENCOUNTER — LAB (OUTPATIENT)
Dept: LAB | Facility: HOSPITAL | Age: 53
End: 2023-02-01
Payer: OTHER GOVERNMENT

## 2023-02-01 DIAGNOSIS — E03.9 ACQUIRED HYPOTHYROIDISM: ICD-10-CM

## 2023-02-01 LAB
DEPRECATED RDW RBC AUTO: 36.4 FL (ref 37–54)
ERYTHROCYTE [DISTWIDTH] IN BLOOD BY AUTOMATED COUNT: 11.8 % (ref 12.3–15.4)
HCT VFR BLD AUTO: 45.1 % (ref 37.5–51)
HGB BLD-MCNC: 15.2 G/DL (ref 13–17.7)
IRON 24H UR-MRATE: 101 MCG/DL (ref 59–158)
IRON SATN MFR SERPL: 27 % (ref 20–50)
MCH RBC QN AUTO: 28.7 PG (ref 26.6–33)
MCHC RBC AUTO-ENTMCNC: 33.7 G/DL (ref 31.5–35.7)
MCV RBC AUTO: 85.1 FL (ref 79–97)
PLATELET # BLD AUTO: 207 10*3/MM3 (ref 140–450)
PMV BLD AUTO: 10.1 FL (ref 6–12)
RBC # BLD AUTO: 5.3 10*6/MM3 (ref 4.14–5.8)
T4 FREE SERPL-MCNC: 1.13 NG/DL (ref 0.93–1.7)
TIBC SERPL-MCNC: 374 MCG/DL (ref 298–536)
TRANSFERRIN SERPL-MCNC: 251 MG/DL (ref 200–360)
TSH SERPL DL<=0.05 MIU/L-ACNC: 5.21 UIU/ML (ref 0.27–4.2)
VIT B12 BLD-MCNC: 651 PG/ML (ref 211–946)
WBC NRBC COR # BLD: 6.4 10*3/MM3 (ref 3.4–10.8)

## 2023-02-01 PROCEDURE — 84443 ASSAY THYROID STIM HORMONE: CPT | Performed by: FAMILY MEDICINE

## 2023-02-01 PROCEDURE — 84403 ASSAY OF TOTAL TESTOSTERONE: CPT | Performed by: FAMILY MEDICINE

## 2023-02-01 PROCEDURE — 84402 ASSAY OF FREE TESTOSTERONE: CPT | Performed by: FAMILY MEDICINE

## 2023-02-01 PROCEDURE — 82607 VITAMIN B-12: CPT | Performed by: FAMILY MEDICINE

## 2023-02-01 PROCEDURE — 83540 ASSAY OF IRON: CPT | Performed by: FAMILY MEDICINE

## 2023-02-01 PROCEDURE — 85027 COMPLETE CBC AUTOMATED: CPT | Performed by: FAMILY MEDICINE

## 2023-02-01 PROCEDURE — 84466 ASSAY OF TRANSFERRIN: CPT | Performed by: FAMILY MEDICINE

## 2023-02-01 PROCEDURE — 84439 ASSAY OF FREE THYROXINE: CPT | Performed by: FAMILY MEDICINE

## 2023-02-01 RX ORDER — LEVOTHYROXINE SODIUM 0.12 MG/1
125 TABLET ORAL
Qty: 90 TABLET | Refills: 0 | Status: SHIPPED | OUTPATIENT
Start: 2023-02-01

## 2023-02-06 LAB
TESTOST FREE SERPL-MCNC: 7.2 PG/ML (ref 7.2–24)
TESTOST SERPL-MCNC: 639 NG/DL (ref 264–916)

## 2023-03-03 ENCOUNTER — OFFICE VISIT (OUTPATIENT)
Dept: CARDIOLOGY | Facility: CLINIC | Age: 53
End: 2023-03-03
Payer: OTHER GOVERNMENT

## 2023-03-03 VITALS
HEIGHT: 68 IN | SYSTOLIC BLOOD PRESSURE: 134 MMHG | DIASTOLIC BLOOD PRESSURE: 83 MMHG | WEIGHT: 206 LBS | HEART RATE: 56 BPM | BODY MASS INDEX: 31.22 KG/M2

## 2023-03-03 DIAGNOSIS — I25.10 CORONARY ARTERY DISEASE INVOLVING NATIVE CORONARY ARTERY OF NATIVE HEART WITHOUT ANGINA PECTORIS: Primary | ICD-10-CM

## 2023-03-03 DIAGNOSIS — E78.5 HYPERLIPIDEMIA LDL GOAL <70: ICD-10-CM

## 2023-03-03 DIAGNOSIS — Z95.5 S/P CORONARY ARTERY STENT PLACEMENT: ICD-10-CM

## 2023-03-03 DIAGNOSIS — I25.2 HISTORY OF NON-ST ELEVATION MYOCARDIAL INFARCTION (NSTEMI): ICD-10-CM

## 2023-03-03 PROCEDURE — 99214 OFFICE O/P EST MOD 30 MIN: CPT | Performed by: INTERNAL MEDICINE

## 2023-03-03 RX ORDER — CLOPIDOGREL BISULFATE 75 MG/1
75 TABLET ORAL DAILY
Qty: 90 TABLET | Refills: 3 | Status: SHIPPED | OUTPATIENT
Start: 2023-03-03

## 2023-03-03 RX ORDER — ROSUVASTATIN CALCIUM 20 MG/1
20 TABLET, COATED ORAL DAILY
Qty: 90 TABLET | Refills: 3 | Status: SHIPPED | OUTPATIENT
Start: 2023-03-03

## 2023-03-03 RX ORDER — ASPIRIN 81 MG/1
81 TABLET ORAL DAILY
Qty: 90 TABLET | Refills: 3 | Status: SHIPPED | OUTPATIENT
Start: 2023-03-03

## 2023-03-03 NOTE — PROGRESS NOTES
Chief Complaint  Follow-up and Coronary Artery Disease    Subjective            Darrick Mooney presents to [facility] CARDIOLOGY  History of Present Illness  Mr. Mooney presents for routine follow up today:  -He states he is feeling well overall with no episodes of chest pain/pressure, palpitations/tachycardia, orthopnea, PND, peripheral edema, lightheadedness, or syncope reported.  -However, he does have some mild shortness of breath with activity, which he thinks may be caused by his Brilinta usage (he is agreeable to switch this medication to Plavix instead).   -Mr. 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  We previously tried to switch him to PCSK9 inhibitor therapy with evolocumab, but his insurance denied this medication.  -He is a non-smoker and his BP was stable at 134/83 today    Past Medical History:   Diagnosis Date   • Heart attack (HCC)    • Hyperlipidemia    • Hypertension    • Labral tear of hip joint    • Migraine headache    • RLS (restless legs syndrome)    • Spinal stenosis at L4-L5 level CURRENTLY       Past Surgical History:   • APPENDECTOMY   • COLONOSCOPY   • CORONARY STENT PLACEMENT   • ELBOW ARTHROPLASTY   • NOSE SURGERY    deviated septum       Social History     Tobacco Use   • Smoking status: Never   • Smokeless tobacco: Current     Types: Chew   • Tobacco comments:     1 can every 2 days   Vaping Use   • Vaping Use: Never used   Substance Use Topics   • Alcohol use: Yes     Comment: occaionally    • Drug use: Never       Family History   Problem Relation Age of Onset   • Heart disease Mother    • Heart disease Maternal Grandmother    • Diabetes Maternal Grandmother         mellitus   • Hypertension Maternal Grandmother    • Cancer Paternal Grandmother    • Cancer Other    • Heart disease  "Other    • Heart disease Other         Current Outpatient Medications on File Prior to Visit   Medication Sig   • cholecalciferol (VITAMIN D3) 25 MCG (1000 UT) tablet Take 1 tablet by mouth Daily.   • Ginkgo Biloba (GINKOBA PO) Take  by mouth.   • levothyroxine (Synthroid) 125 MCG tablet Take 1 tablet by mouth Every Morning.   • Probiotic Product (PROBIOTIC-10 PO) Take  by mouth.   • [DISCONTINUED] aspirin (aspirin) 81 MG EC tablet Take 1 tablet by mouth Daily.   • [DISCONTINUED] rosuvastatin (CRESTOR) 20 MG tablet Take 1 tablet by mouth Daily.   • [DISCONTINUED] ticagrelor (BRILINTA) 90 MG tablet tablet Take 1 tablet by mouth 2 (Two) Times a Day.     No current facility-administered medications on file prior to visit.       No Known Allergies    Review of Systems   Constitutional: Negative for activity change, chills and fever.   HENT: Negative for hearing loss, nosebleeds and sore throat.    Eyes: Negative for pain and visual disturbance.   Respiratory: Positive for shortness of breath. Negative for cough, chest tightness and wheezing.    Cardiovascular: Negative for chest pain, palpitations and leg swelling.   Gastrointestinal: Negative for abdominal pain, blood in stool and vomiting.   Genitourinary: Negative for dysuria and hematuria.   Musculoskeletal: Positive for back pain and myalgias. Negative for joint swelling.   Skin: Negative for color change, pallor and rash.   Neurological: Negative for tremors, syncope, light-headedness and headache.        Objective     /83   Pulse 56   Ht 172.7 cm (68\")   Wt 93.4 kg (206 lb)   BMI 31.32 kg/m²       Physical Exam  Constitutional:       General: He is not in acute distress.     Appearance: Normal appearance.   HENT:      Head: Atraumatic.      Mouth/Throat:      Mouth: Mucous membranes are moist.      Pharynx: Oropharynx is clear. No oropharyngeal exudate.   Eyes:      General: No scleral icterus.     Conjunctiva/sclera: Conjunctivae normal.   Neck:      " Vascular: No carotid bruit or JVD.   Cardiovascular:      Rate and Rhythm: Regular rhythm. Bradycardia present.      Chest Wall: PMI is not displaced.      Pulses:           Radial pulses are 2+ on the right side and 2+ on the left side.      Heart sounds: S1 normal and S2 normal. No murmur heard.    No friction rub. No gallop.   Pulmonary:      Effort: Pulmonary effort is normal. No tachypnea or respiratory distress.      Breath sounds: No wheezing, rhonchi or rales.   Chest:      Chest wall: No tenderness.   Abdominal:      General: Bowel sounds are normal. There is no distension.      Palpations: Abdomen is soft. There is no mass.      Tenderness: There is no abdominal tenderness.   Musculoskeletal:         General: No swelling, tenderness or deformity.      Cervical back: Neck supple. No tenderness.      Right lower leg: No edema.      Left lower leg: No edema.   Skin:     General: Skin is warm and dry.      Coloration: Skin is not jaundiced.      Findings: No erythema or rash.      Nails: There is no clubbing.   Neurological:      General: No focal deficit present.      Mental Status: He is alert and oriented to person, place, and time.      Motor: No weakness.   Psychiatric:         Mood and Affect: Mood normal.         Behavior: Behavior normal.         Result Review :     The following data was reviewed by: Laith Navarro MD on 03/03/2023:    CMP    CMP 7/27/22 8/3/22 11/1/22   Glucose 103 (A) 99 102 (A)   BUN 13 13 19   Creatinine 1.36 (A) 1.40 (A) 1.30 (A)   eGFR 63.0 60.9 66.5   Sodium 144 142 143   Potassium 5.1 4.6 4.6   Chloride 107 107 105   Calcium 10.0 9.6 9.3   Total Protein   6.6   Albumin   4.60   Globulin   2.0   Total Bilirubin   0.9   Alkaline Phosphatase   81   AST (SGOT)   36   ALT (SGPT)   44 (A)   Albumin/Globulin Ratio   2.3   BUN/Creatinine Ratio 9.6 9.3 14.6   Anion Gap 11.0 9.8 11.2   (A) Abnormal value       Comments are available for some flowsheets but are not being displayed.            CBC    CBC 7/20/22 2/1/23   WBC 10.24 6.40   RBC 5.74 5.30   Hemoglobin 16.4 15.2   Hematocrit 48.0 45.1   MCV 83.6 85.1   MCH 28.6 28.7   MCHC 34.2 33.7   RDW 12.1 (A) 11.8 (A)   Platelets 217 207   (A) Abnormal value            Lipid Panel    Lipid Panel 5/20/22 11/1/22   Total Cholesterol 108 113   Triglycerides 118 103   HDL Cholesterol 37 (A) 45   VLDL Cholesterol 21 19   LDL Cholesterol  50 49   LDL/HDL Ratio 1.28 1.05   (A) Abnormal value                 Assessment and Plan      Diagnoses and all orders for this visit:    1. History of non-ST elevation myocardial infarction (NSTEMI) (Primary)  -     aspirin 81 MG EC tablet; Take 1 tablet by mouth Daily.  Dispense: 90 tablet; Refill: 3  -     clopidogrel (PLAVIX) 75 MG tablet; Take 1 tablet by mouth Daily.  Dispense: 90 tablet; Refill: 3    2. Coronary artery disease involving native coronary artery of native heart without angina pectoris  -     aspirin 81 MG EC tablet; Take 1 tablet by mouth Daily.  Dispense: 90 tablet; Refill: 3  -     rosuvastatin (CRESTOR) 20 MG tablet; Take 1 tablet by mouth Daily.  Dispense: 90 tablet; Refill: 3  -     clopidogrel (PLAVIX) 75 MG tablet; Take 1 tablet by mouth Daily.  Dispense: 90 tablet; Refill: 3  -     Evolocumab with Infusor (REPATHA) solution cartridge; Inject 3.5 mL under the skin into the appropriate area as directed Every 30 (Thirty) Days.  Dispense: 3.5 mL; Refill: 11    3. S/P coronary artery stent placement  -     aspirin 81 MG EC tablet; Take 1 tablet by mouth Daily.  Dispense: 90 tablet; Refill: 3  -     clopidogrel (PLAVIX) 75 MG tablet; Take 1 tablet by mouth Daily.  Dispense: 90 tablet; Refill: 3    4. Hyperlipidemia LDL goal <70  -     rosuvastatin (CRESTOR) 20 MG tablet; Take 1 tablet by mouth Daily.  Dispense: 90 tablet; Refill: 3  -     Evolocumab with Infusor (REPATHA) solution cartridge; Inject 3.5 mL under the skin into the appropriate area as directed Every 30 (Thirty) Days.  Dispense:  3.5 mL; Refill: 11    -Switch Brilinta to Plavix due to shortness of breath with activity; he wishes to remain on DAPT long term (although he may have to stop it if he is redeployed to [facility]).  -LDL remains controlled on rosuvastatin, but he is having significant trouble tolerating it (due to severe myalgias in his legs every time he exercises; he states these take a couple days to resolve).  -He has failed to tolerate two different statins well, so I will again try to get him on PCSK9 inhibitor therapy with Repatha.  For now, I refilled his rosuvastatin Rx, in case Repatha is once again denied by his insurance company.  -He is not on chronic beta blocker therapy due to baseline sinus bradycardia.         Follow Up     Return in about 8 months (around 11/3/2023) for Next scheduled follow up, with JOSE Bradley.    Patient was given instructions and counseling regarding his condition or for health maintenance advice. Please see specific information pulled into the AVS if appropriate.     Darrick Oscar Vinicio  reports that he has never smoked. His smokeless tobacco use includes chew.  I have educated him on the risk of diseases from using tobacco products such as cancer, COPD and heart disease.        Laith Navarro MD, FACC, [facility]  Interventional Cardiology

## 2023-03-15 ENCOUNTER — TELEPHONE (OUTPATIENT)
Dept: CARDIOLOGY | Facility: CLINIC | Age: 53
End: 2023-03-15
Payer: OTHER GOVERNMENT

## 2023-03-15 NOTE — TELEPHONE ENCOUNTER
Caller: Darrick Mooney    Relationship: Self    Best call back number: [phone]    What is the best time to reach you: 8AM-4PM    Who are you requesting to speak with (clinical staff, provider,  specific staff member): CLINICAL FOR DR QUINONEZ    What was the call regarding: PT IS NEEDING A PRIOR AUTH FOR HIS REPATHA MEDICATION THAT DR PRICE PRESCRIBED.  IS REQUIRING THE AUTH BEFORE GIVING THE MEDICATION TO THE PT. PT TRIED CALLING LAST WEEK, BUT GOT NO ANSWER OR CALL BACK. PLEASE ADVISE PT ON AUTH FOR MEDICATION    Do you require a callback: YES

## 2023-05-18 ENCOUNTER — TELEPHONE (OUTPATIENT)
Dept: FAMILY MEDICINE CLINIC | Facility: CLINIC | Age: 53
End: 2023-05-18

## 2023-05-18 NOTE — TELEPHONE ENCOUNTER
Caller: Darrick Mooney    Relationship: Self    Best call back number: [phone]    What is the best time to reach you: ANY     Who are you requesting to speak with (clinical staff, provider,  specific staff member): CLINICAL     What was the call regarding: PATIENT STATED WHEN HE LAST SAW THANH HE HAD BEEN TOLD HE WAS NEEDING TO GET SOME BLOODWORK. PATIENT WENT TODAY TO TRY AND GET THAT BLOODWORK DONE AND THERE WAS NOT ANY ORDERS IN HIS CHART. PATIENT WANTING TO KNOW IF THANH WAS STILL WANTING HIM TO GET SOME BLOOD WORK DONE. PLEASE ADVISE.     Do you require a callback: YES

## 2023-06-13 ENCOUNTER — OFFICE VISIT (OUTPATIENT)
Dept: SLEEP MEDICINE | Facility: HOSPITAL | Age: 53
End: 2023-06-13
Payer: OTHER GOVERNMENT

## 2023-06-13 VITALS
DIASTOLIC BLOOD PRESSURE: 80 MMHG | OXYGEN SATURATION: 97 % | BODY MASS INDEX: 30.71 KG/M2 | WEIGHT: 202.6 LBS | HEART RATE: 58 BPM | HEIGHT: 68 IN | SYSTOLIC BLOOD PRESSURE: 125 MMHG

## 2023-06-13 DIAGNOSIS — Z99.89 OSA ON CPAP: Primary | ICD-10-CM

## 2023-06-13 DIAGNOSIS — G47.33 OSA ON CPAP: Primary | ICD-10-CM

## 2023-06-13 DIAGNOSIS — G25.2: ICD-10-CM

## 2023-06-13 DIAGNOSIS — G47.61 PLMD (PERIODIC LIMB MOVEMENT DISORDER): ICD-10-CM

## 2023-06-13 DIAGNOSIS — G47.51 CONFUSIONAL AROUSALS: ICD-10-CM

## 2023-06-13 DIAGNOSIS — G47.8 SLEEP DYSFUNCTION WITH AROUSAL DISTURBANCE: ICD-10-CM

## 2023-06-13 PROCEDURE — G0463 HOSPITAL OUTPT CLINIC VISIT: HCPCS

## 2023-06-13 RX ORDER — MULTIVIT WITH MINERALS/LUTEIN
250 TABLET ORAL DAILY
COMMUNITY

## 2023-06-13 NOTE — PROGRESS NOTES
Sleep Consultation    Patient Name: Darrick Mooney  Age/Sex: 53 y.o. male  : 1970  MRN: [id number]    Date of Encounter Visit: 2023  Encounter Provider: Paul Rojas MD  Referring Provider: JOSE Mendenhall  Place of Service: [facility]  Patient Care Team:  Wood Martines DO as PCP - General (Family Medicine)    Subjective:     Reason for Consult: ODESSA    History of Present Illness:  Darrick Mooney is a 53 y.o. male is here for evaluation of ODESSA , initially diagnosed in  and started on CPAP in   .  Patient has prior evaluation for sleep apnea was positive for sleep study and is already on CPAP 5-15 with a compliance download showing 80% compliance with average of 8 hours plus of nightly use with good control of the sleep apnea.  He did have an incident with confusional arousal and was seen by neurologist , he was ruled out for seizure but was referred here for evaluation of possible sleep related disorder   He feels much better on the CPAP and he loves how he feels on it and plans to continues to use it   He does not recall how bad the sleep apnea, but he does recall being told that he did have severe PLMD   His DME was [employer]   Patient complains of daytime fatigue and sleepiness with an [place] Sleepiness Scale (ESS) of 13.  Patient complains of loud snoring with daytime fatigue and sleepiness, worsening symptoms with weight gain, waking up gasping for breath and waking up with night sweats, frequent leg jerking at night, restless leg syndrome, sleep paralysis, hypnagogic hallucination, no cataplexy.  Patient had difficulties staying asleep and with frequent awakenings and no significant nocturia.  Patient is checked positive for most of the symptoms on the sleep questionnaires  Patient denies any cataplexy but did have sleep paralysis and hallucination.  .  Patient denies any parasomnias.  Denies any history of seizure disorder or  recent head trauma.  Patient spends adequate amount of time in bed with no evidence of sleep restriction or improper sleep hygiene.  Bed time 9 pm and wake up time 6 am, sleep onset in 15 min, he has 2 coffes a day, minimal alcohol   Comorbidities include: PLMD, radiculopathy,     Review of Systems:   A twelve-system review was conducted and was negative except for the following: Frequent urination, hoarseness nasal congestion, neck pain, fever and fatigue, shortness of breath, wheezing, dizziness, problems swallowing with bloating and cold intolerance.        Past Medical History:  Past Medical History:   Diagnosis Date   • Acid reflux    • Arthritis    • Asthma    • Heart attack    • Hyperlipidemia    • Hypertension    • Labral tear of hip joint    • Migraine headache    • RLS (restless legs syndrome)    • Spinal stenosis at L4-L5 level CURRENTLY       Past Surgical History:   Procedure Laterality Date   • APPENDECTOMY     • COLONOSCOPY  06/05/2011   • CORONARY STENT PLACEMENT     • ELBOW ARTHROPLASTY Left 2014   • NOSE SURGERY  2002    deviated septum       Home Medications:     Current Outpatient Medications:   •  aspirin 81 MG EC tablet, Take 1 tablet by mouth Daily., Disp: 90 tablet, Rfl: 3  •  cholecalciferol (VITAMIN D3) 25 MCG (1000 UT) tablet, Take 1 tablet by mouth Daily., Disp: , Rfl:   •  clopidogrel (PLAVIX) 75 MG tablet, Take 1 tablet by mouth Daily., Disp: 90 tablet, Rfl: 3  •  CO-ENZYME Q-10 PO, Take  by mouth., Disp: , Rfl:   •  Cyanocobalamin (VITAMIN B 12 PO), Take  by mouth., Disp: , Rfl:   •  Evolocumab with Infusor (REPATHA) solution cartridge, Inject 3.5 mL under the skin into the appropriate area as directed Every 30 (Thirty) Days., Disp: 3.5 mL, Rfl: 11  •  Ginkgo Biloba (GINKOBA PO), Take  by mouth., Disp: , Rfl:   •  Green Tea, Claribel sinensis, (GREEN  PO), Take  by mouth., Disp: , Rfl:   •  levothyroxine (Synthroid) 125 MCG tablet, Take 1 tablet by mouth Every Morning., Disp: 90  "tablet, Rfl: 0  •  Probiotic Product (PROBIOTIC-10 PO), Take  by mouth., Disp: , Rfl:   •  rosuvastatin (CRESTOR) 20 MG tablet, Take 1 tablet by mouth Daily., Disp: 90 tablet, Rfl: 3  •  vitamin C (ASCORBIC ACID) 250 MG tablet, Take 1 tablet by mouth Daily., Disp: , Rfl:     Allergies:  No Known Allergies    Past Social History:  Social History     Socioeconomic History   • Marital status:    Tobacco Use   • Smoking status: Never   • Smokeless tobacco: Current     Types: Chew   • Tobacco comments:     1 can every 2 days   Vaping Use   • Vaping Use: Never used   Substance and Sexual Activity   • Alcohol use: Yes     Comment: occaionally    • Drug use: Never   • Sexual activity: Defer       Past Family History:  Family History   Problem Relation Age of Onset   • Heart disease Mother    • Sleep walking Mother    • Sleep disorder Mother         night terrors   • Heart disease Maternal Grandmother    • Diabetes Maternal Grandmother         mellitus   • Hypertension Maternal Grandmother    • Sleep walking Maternal Grandmother    • Sleep disorder Maternal Grandmother         NIght terrors   • Cancer Paternal Grandmother    • Cancer Other    • Heart disease Other    • Heart disease Other         Objective:        Vital Signs:   Visit Vitals  /80   Pulse 58   Ht 172.7 cm (68\")   Wt 91.9 kg (202 lb 9.6 oz)   SpO2 97%   BMI 30.81 kg/m²     Wt Readings from Last 3 Encounters:   06/13/23 91.9 kg (202 lb 9.6 oz)   05/12/23 91.6 kg (202 lb)   03/03/23 93.4 kg (206 lb)     Neck Circumference: 15 inches    Physical Exam:   GEN:  No acute distress, alert, cooperative, well developed   EYES:   Sclerae clear. No icterus. PERRL. Normal EOM  ENT:   External ears/nose normal, no oral lesions, no thrush, mucous membranes moist, Septum midline. Mallampati II airway. Large uvula, buccal hypertrophy   NECK:  Supple, midline trachea, no JVD  LUNGS: Normal chest on inspection, CTAB, no wheezes. No rhonchi. No crackles. Respirations " regular, even and unlabored.   CV:  Regular rhythm and rate. Normal S1/S2. No murmurs, gallops, or rubs noted.  ABD:  Soft, nontender and nondistended. Normal bowel sounds. No guarding  EXT:  Moves all extremities well. No cyanosis. No redness. No edema.   Skin: Dry, intact, no bleeding      Diagnostic Data:  Prior sleep study unavailable      Assessment and Plan:       ICD-10-CM ICD-9-CM   1. ODESSA on CPAP  G47.33 327.23    Z99.89 V46.8   2. RLS (restless legs syndrome)  G25.81 333.94   3. Hallucination, hypnopompic  R44.2 780.1   4. Sleep dysfunction with arousal disturbance  G47.8 307.46   5. Confusional arousals  G47.51 327.41       Recommendations:     Patient has sleep apnea but were not sure about the severity of the condition and he is already on the CPAP with a download showing adequate control however with the recent incident we need to make sure that his sleep apnea is perfectly controlled and this can be achieved with a titration study  He is a good candidate for split-night study to get a diagnostic test and at the same time we can do the CPAP titration during the second half to confirm the adequate control of the sleep apnea  In case the patient has underlying severe paretic leg movement disorder as he was told, we will evaluate the degree of sleep fragmentation caused by this leg movement disorder and decide if pharmacological treatment is necessary  As far as the confusional arousals, these can be triggered by sleep deprivation and sometimes can be a primary disorder.  Patient was already ruled out for any major neurological events or seizure and will address with pharmacological therapy only if they are more frequent and posing some significant impact on the sleep quality or risk of injury otherwise we will try to treat any other cause of sleep fragmentation that may promote those confusional arousals, they may be part of the hypnagogic hallucination rather than confusional arousals.  Patient is  agreeable with the above plan, we will arrange for the split study and will try to split any AHI above 5  We will address the PLMD and more details on the next visit, we will follow-up after the sleep study  Patient was educated about the impact of obesity on sleep apnea and the benefit of weight loss and weight loss was recommended    Orders Placed This Encounter   Procedures   • Polysomnography 4 or More Parameters     No orders of the defined types were placed in this encounter.     No follow-ups on file.    Paul Rojas MD   [facility]   06/13/23  15:44 EDT    Dictated utilizing Dragon dictation

## 2023-10-03 ENCOUNTER — OFFICE VISIT (OUTPATIENT)
Dept: SLEEP MEDICINE | Facility: HOSPITAL | Age: 53
End: 2023-10-03
Payer: OTHER GOVERNMENT

## 2023-10-03 VITALS
HEART RATE: 46 BPM | DIASTOLIC BLOOD PRESSURE: 92 MMHG | HEIGHT: 68 IN | BODY MASS INDEX: 30.92 KG/M2 | SYSTOLIC BLOOD PRESSURE: 134 MMHG | WEIGHT: 204 LBS | OXYGEN SATURATION: 97 %

## 2023-10-03 DIAGNOSIS — G43.909 MIGRAINE WITHOUT STATUS MIGRAINOSUS, NOT INTRACTABLE, UNSPECIFIED MIGRAINE TYPE: ICD-10-CM

## 2023-10-03 DIAGNOSIS — G47.33 OSA ON CPAP: Primary | ICD-10-CM

## 2023-10-03 DIAGNOSIS — G47.30 HYPERSOMNIA WITH SLEEP APNEA: ICD-10-CM

## 2023-10-03 DIAGNOSIS — I21.4 NSTEMI (NON-ST ELEVATED MYOCARDIAL INFARCTION): ICD-10-CM

## 2023-10-03 DIAGNOSIS — G47.10 HYPERSOMNIA WITH SLEEP APNEA: ICD-10-CM

## 2023-10-03 PROCEDURE — G0463 HOSPITAL OUTPT CLINIC VISIT: HCPCS

## 2023-10-03 NOTE — PROGRESS NOTES
Sleep Disorder Follow Up    Patient Name: Darrick Mooney  Age/Sex: 53 y.o. male  : 1970  MRN: [id number]    Date of Encounter Visit: 10/03/23   Referring Provider: Paul Rojas MD  Place of Service: [facility]  Patient Care Team:  Rita Jim APRN as PCP - General (Nurse Practitioner)    PROBLEM LIST:  Obstructive sleep apnea  Confusional arousal  Hallucination  Hypersomnia with sleep apnea  Coronary artery disease    History of Present Illness:  Darrick Mooney is a 53 y.o. male who is here for follow up on ODESSA. Patient was last seen in the office on 2023.  Since last visit, Since last evaluation the patient was scheduled for an in lab split study that was done on 2023 and was positive for moderate sleep apnea with overall AHI of 27 with a supine AHI of 120 and REM AHI of 42.9.  CPAP titration done and resulted in good control on a CPAP of 8 even including the supine REM sleep..  Patient uses machine every night with no complaints from the mask or the pressure.  Patient uses a fullface mask mask, whicoes  fit well. Patient denies any problem with air leak.  Or dry mouth.   Patient sleeps better and has a deeper sleep with the machine and feels more energy during the day time.  Currently on 5-15 cm H20.  (Was supposed to be set up on a CPAP of 8 based on the titration study)  [place] Sleepiness Scale (ESS) is 14.  Compliance download was reviewed and documented below.  Weight is up by 2 pounds since last visit.  Other comorbidities include coronary artery disease, restless leg movement with only mild p eriodic leg movement disorder on the in-lab sleep study      Review of Systems:   A ten-system review was conducted and was negative except for the following: Nasal congestion, shortness of breath cough and acid reflux, morning headache.        Past Medical History:  Past medical, surgical, social, and family history, except as mentioned above, was unchanged  from the last visit.     Past Medical History:   Diagnosis Date    Acid reflux     Arthritis     Asthma     Heart attack     Hyperlipidemia     Hypertension     Labral tear of hip joint     Migraine headache     RLS (restless legs syndrome)     Spinal stenosis at L4-L5 level CURRENTLY       Past Surgical History:   Procedure Laterality Date    APPENDECTOMY      COLONOSCOPY  06/05/2011    CORONARY STENT PLACEMENT      ELBOW ARTHROPLASTY Left 2014    NOSE SURGERY  2002    deviated septum       Home Medications:     Current Outpatient Medications:     aspirin 81 MG EC tablet, Take 1 tablet by mouth Daily., Disp: 90 tablet, Rfl: 3    cholecalciferol (VITAMIN D3) 25 MCG (1000 UT) tablet, Take 1 tablet by mouth Daily., Disp: , Rfl:     clopidogrel (PLAVIX) 75 MG tablet, Take 1 tablet by mouth Daily., Disp: 90 tablet, Rfl: 3    CO-ENZYME Q-10 PO, Take  by mouth., Disp: , Rfl:     Cyanocobalamin (VITAMIN B 12 PO), Take  by mouth., Disp: , Rfl:     levothyroxine (Synthroid) 125 MCG tablet, Take 1 tablet by mouth Every Morning., Disp: 90 tablet, Rfl: 0    Probiotic Product (PROBIOTIC-10 PO), Take  by mouth., Disp: , Rfl:     rosuvastatin (CRESTOR) 20 MG tablet, Take 1 tablet by mouth Daily., Disp: 90 tablet, Rfl: 3    vitamin C (ASCORBIC ACID) 250 MG tablet, Take 1 tablet by mouth Daily., Disp: , Rfl:     Evolocumab with Infusor (REPATHA) solution cartridge, Inject 3.5 mL under the skin into the appropriate area as directed Every 30 (Thirty) Days. (Patient not taking: Reported on 10/3/2023), Disp: 3.5 mL, Rfl: 11    Ginkgo Biloba (GINKOBA PO), Take  by mouth. (Patient not taking: Reported on 10/3/2023), Disp: , Rfl:     Green Tea, Claribel sinensis, (GREEN  PO), Take  by mouth. (Patient not taking: Reported on 10/3/2023), Disp: , Rfl:     Allergies:  No Known Allergies    Past Social History:  Social History     Socioeconomic History    Marital status:    Tobacco Use    Smoking status: Never    Smokeless  "tobacco: Current     Types: Chew    Tobacco comments:     1 can every 2 days   Vaping Use    Vaping Use: Never used   Substance and Sexual Activity    Alcohol use: Yes     Comment: occaionally     Drug use: Never    Sexual activity: Defer       Past Family History:  Family History   Problem Relation Age of Onset    Heart disease Mother     Sleep walking Mother     Sleep disorder Mother         night terrors    Heart disease Maternal Grandmother     Diabetes Maternal Grandmother         mellitus    Hypertension Maternal Grandmother     Sleep walking Maternal Grandmother     Sleep disorder Maternal Grandmother         NIght terrors    Cancer Paternal Grandmother     Cancer Other     Heart disease Other     Heart disease Other          Objective:        Vital Signs:   Visit Vitals  /92   Pulse (!) 46   Ht 172.7 cm (67.99\")   Wt 92.5 kg (204 lb)   SpO2 97%   BMI 31.03 kg/m²     Wt Readings from Last 3 Encounters:   10/03/23 92.5 kg (204 lb)   06/13/23 91.9 kg (202 lb 9.6 oz)   05/12/23 91.6 kg (202 lb)          Physical Exam:   GEN:  No acute distress, alert, cooperative, well developed   EYES:   Sclerae clear. No icterus. PERRL. Normal EOM  ENT:   External ears/nose normal, no oral lesions, no thrush, mucous membranes moist, Septum midline. Mallampati II airway. Large uvula, buccal hypertrophy   NECK:  Supple, midline trachea, no JVD  LUNGS: Normal chest on inspection, CTAB, no wheezes. No rhonchi. No crackles. Respirations regular, even and unlabored.   CV:  Regular rhythm and rate. Normal S1/S2. No murmurs, gallops, or rubs noted.  ABD:  Soft, nontender and nondistended. Normal bowel sounds. No guarding  EXT:  Moves all extremities well. No cyanosis. No redness. No edema.   Skin: Dry, intact, no bleeding    Diagnostic Data:  In lab polysomnography 7/11/2023:       Tech Summary: LIGHTS ON; PATIENT WAS SPLIT USING A [employer] SIMPLUS FULL FACE MASK, SIZE MEDIUM; TITRATED TO CPAP 8 (07/12/23 0501 : Dwayne, " Maricruz)     POLYSOMNOGRAM RESULTS:   This was a split stuy done on 7/11/2023  sleep efficiency 56%  stage I 11.1%, stage II 54.5%, stage III 5.7%, REM 28.7% with 3 cycles of REM/Non REM sleep   respiratory summary was positive for moderate ODESSA with AHI 27/RDI 28.5   sleep hypoxemia with Yassine of 84% with 9.1 min below 90% (4.7 min below 89%)  no arrhythmias   supine AHI was 120, REM AHI was 42.9   snoring 40.9%  mild Periodic leg mouvement disorder with PLMD Index of 15.7 , with no clinically significant associated microarousals   sleep latency 54 min with REM latency 68 min   CPAP titration started with CPAP of 5 that was titrated to CPAP of 8 cm H2O with residual AHI 0.0, and RDI of 0.7 , inclusing 17 min of REM sleep and more than 20 min of supine sleep, but no simultaneous REM/Supine   Impression:   Moderate ODESSA with severe Supine and severe REM component , with excellent control on CPAP of 8 cm H2O  Sleep related hypoxemia   Mild PLMD     Plan:   Start CPAP at 8 cm H2O    Compliance download from 8/3/2023 - 9/1/2023 showed 100% adherence with 7 hours and 46 minutes average nightly use.  Sleep apnea is very well controlled with residual AHI of 1.7.  Patient is on auto CPAP 5-15 with a median/95th percentile pressure of 6.0/8.8 cm H2O (orders were sent for a CPAP of 8)  Air leak was minimal with a median/95th percentile of 2.6/5.8 L/min    Did review with the patient the results of the sleep study and only abnormal finding we also reviewed the results from the compliance download    Assessment and Plan:       ICD-10-CM ICD-9-CM   1. ODESSA on CPAP  G47.33 327.23   2. Migraine without status migrainosus, not intractable, unspecified migraine type  G43.909 346.90   3. NSTEMI (non-ST elevated myocardial infarction)  I21.4 410.70   4. Hypersomnia with sleep apnea  G47.10 780.53    G47.30        Recommendations:     Patient is compliant with the CPAP as evident from the compliance download  Patient is getting both  clinical and subjective benefit from the use of the CPAP machine  The CPAP machine is effective in controlling the underlying sleep apnea  CPAP is strongly recommended to be continued  Patient to continue work on the weight loss  No pressure adjustment recommended  Continue with a yearly follow-up or sooner as needed  Patient had question about the hypoglossal nerve stimulator which was reviewed with the patient in details and at this point is not recommended as long as he is comfortable with the CPAP.  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    Return in about 1 year (around 10/3/2024).    Paul Rojas MD   [facility]   10/03/23  15:49 EDT    Dictated utilizing Dragon dictation

## 2024-04-04 ENCOUNTER — PREP FOR SURGERY (OUTPATIENT)
Dept: OTHER | Facility: HOSPITAL | Age: 54
End: 2024-04-04
Payer: OTHER GOVERNMENT

## 2024-04-04 ENCOUNTER — OFFICE VISIT (OUTPATIENT)
Dept: SURGERY | Facility: CLINIC | Age: 54
End: 2024-04-04
Payer: OTHER GOVERNMENT

## 2024-04-04 VITALS
HEART RATE: 54 BPM | SYSTOLIC BLOOD PRESSURE: 138 MMHG | BODY MASS INDEX: 31.74 KG/M2 | DIASTOLIC BLOOD PRESSURE: 89 MMHG | HEIGHT: 68 IN | WEIGHT: 209.4 LBS

## 2024-04-04 DIAGNOSIS — Z12.11 SCREENING FOR MALIGNANT NEOPLASM OF COLON: Primary | ICD-10-CM

## 2024-04-04 RX ORDER — SODIUM CHLORIDE 9 MG/ML
40 INJECTION, SOLUTION INTRAVENOUS AS NEEDED
OUTPATIENT
Start: 2024-04-04

## 2024-04-04 RX ORDER — SIMETHICONE 80 MG
TABLET,CHEWABLE ORAL
Qty: 4 TABLET | Refills: 0 | Status: SHIPPED | OUTPATIENT
Start: 2024-04-04

## 2024-04-04 RX ORDER — PEG-3350, SODIUM SULFATE, SODIUM CHLORIDE, POTASSIUM CHLORIDE, SODIUM ASCORBATE AND ASCORBIC ACID 7.5-2.691G
1000 KIT ORAL ONCE
Qty: 1000 ML | Refills: 0 | Status: SHIPPED | OUTPATIENT
Start: 2024-04-04 | End: 2024-04-04

## 2024-04-04 RX ORDER — SODIUM CHLORIDE 0.9 % (FLUSH) 0.9 %
3 SYRINGE (ML) INJECTION EVERY 12 HOURS SCHEDULED
OUTPATIENT
Start: 2024-04-04

## 2024-04-04 RX ORDER — SODIUM CHLORIDE 0.9 % (FLUSH) 0.9 %
10 SYRINGE (ML) INJECTION AS NEEDED
OUTPATIENT
Start: 2024-04-04

## 2024-04-04 RX ORDER — PRAVASTATIN SODIUM 10 MG
10 TABLET ORAL DAILY
COMMUNITY

## 2024-04-04 NOTE — PROGRESS NOTES
Chief Complaint: Colonoscopy    Subjective     Colonoscopy consultation       History of Present Illness  Darrick Mooney is a 53 y.o. male presents to [facility] for colonoscopy consultation.    Patient presents today on referral from Devorah Boone for colonoscopy consultation.  Patient denies any abdominal pain, change in bowel habit, or rectal bleeding.  Denies any family history of colorectal cancer.  Patient reports his last colonoscopy was in 2009 and was normal.    Patient admits to ODESSA.  He does use a CPAP at night.    Denies any cardiac issues.  Denies taking a GLP-1 receptors.    Objective     Past Medical History:   Diagnosis Date    Acid reflux     Arthritis     Asthma     Heart attack     Hyperlipidemia     Hypertension     Labral tear of hip joint     Migraine headache     RLS (restless legs syndrome)     Spinal stenosis at L4-L5 level CURRENTLY       Past Surgical History:   Procedure Laterality Date    APPENDECTOMY      COLONOSCOPY  06/05/2011    CORONARY STENT PLACEMENT      ELBOW ARTHROPLASTY Left 2014    NOSE SURGERY  2002    deviated septum       Outpatient Medications Marked as Taking for the 4/4/24 encounter (Office Visit) with Zohra Nelson APRN   Medication Sig Dispense Refill    aspirin 81 MG EC tablet Take 1 tablet by mouth Daily. 90 tablet 3    cholecalciferol (VITAMIN D3) 25 MCG (1000 UT) tablet Take 1 tablet by mouth Daily.      CO-ENZYME Q-10 PO Take  by mouth.      Cyanocobalamin (VITAMIN B 12 PO) Take  by mouth.      levothyroxine (Synthroid) 125 MCG tablet Take 1 tablet by mouth Every Morning. 90 tablet 0    pravastatin (PRAVACHOL) 10 MG tablet Take 1 tablet by mouth Daily.      Probiotic Product (PROBIOTIC-10 PO) Take  by mouth.      vitamin C (ASCORBIC ACID) 250 MG tablet Take 1 tablet by mouth Daily.      Vitamin Mixture (VITAMIN E COMPLETE PO) Take  by mouth.         No Known Allergies     Family History   Problem Relation Age of Onset  "   Heart disease Mother     Sleep walking Mother     Sleep disorder Mother         night terrors    Heart disease Maternal Grandmother     Diabetes Maternal Grandmother         mellitus    Hypertension Maternal Grandmother     Sleep walking Maternal Grandmother     Sleep disorder Maternal Grandmother         NIght terrors    Cancer Paternal Grandmother     Cancer Other     Heart disease Other     Heart disease Other        Social History     Socioeconomic History    Marital status:    Tobacco Use    Smoking status: Never    Smokeless tobacco: Former     Types: Chew    Tobacco comments:     1 can every 2 days   Vaping Use    Vaping status: Never Used   Substance and Sexual Activity    Alcohol use: Yes     Comment: occaionally     Drug use: Never    Sexual activity: Defer       Review of Systems   Constitutional:  Negative for chills and fever.   Gastrointestinal:  Negative for abdominal distention, abdominal pain, anal bleeding, blood in stool, constipation, diarrhea and rectal pain.        Vital Signs:   /89 (BP Location: Right arm, Patient Position: Sitting, Cuff Size: Adult)   Pulse 54   Ht 172.7 cm (68\")   Wt 95 kg (209 lb 6.4 oz)   BMI 31.84 kg/m²      Physical Exam  Vitals and nursing note reviewed.   Constitutional:       General: He is not in acute distress.     Appearance: Normal appearance.   HENT:      Head: Normocephalic.   Cardiovascular:      Rate and Rhythm: Normal rate.   Pulmonary:      Effort: Pulmonary effort is normal.      Breath sounds: No stridor.   Abdominal:      Palpations: Abdomen is soft.      Tenderness: There is no guarding.   Musculoskeletal:         General: No deformity. Normal range of motion.   Skin:     General: Skin is warm and dry.      Coloration: Skin is not jaundiced.   Neurological:      General: No focal deficit present.      Mental Status: He is alert and oriented to person, place, and time.   Psychiatric:         Mood and Affect: Mood normal.         " Thought Content: Thought content normal.          Result Review :              []  Laboratory  []  Radiology  []  Pathology  []  Microbiology  []  EKG/Telemetry   []  Cardiology/Vascular   []  Old records  I spent 15 minutes caring for Darrick on this date of service. This time includes time spent by me in the following activities: reviewing tests, obtaining and/or reviewing a separately obtained history, performing a medically appropriate examination and/or evaluation, ordering medications, tests, or procedures, and documenting information in the medical record.     Assessment and Plan    Diagnoses and all orders for this visit:    1. Screening for malignant neoplasm of colon (Primary)    Other orders  -     PEG-KCl-NaCl-NaSulf-Na Asc-C (MOVIPREP) 100 g reconstituted solution powder; Take 1,000 mL by mouth 1 (One) Time for 1 dose.  Dispense: 1000 mL; Refill: 0  -     simethicone (Gas-X) 80 MG chewable tablet; Take 2 tablets PO after completing movi prep and 2 tablets PO 4 hours prior to procedure.  Dispense: 4 tablet; Refill: 0    Patient does have Plavix on his medication list he reports he did no longer takes it.  He reports that he was cleared from cardiology.      Follow Up   Return for Schedule colonoscopy with Dr. You on 7/11/2024 [facility].    Hospital arrival time: 0700    Possible risks/complications, benefits, and alternatives to surgical or invasive procedures have been explained to patient and/or legal guardian.    Patient has been evaluated and can tolerate anesthesia and/or sedation. Risks, benefits, and alternatives to anesthesia and sedation have been explained to the patient and/or legal guardian. Patient verbalizes understanding and is willing to proceed with the above plan.     Patient was given instructions and counseling regarding his condition or for health maintenance advice. Please see specific information pulled into the AVS if appropriate.

## 2024-05-28 ENCOUNTER — TELEPHONE (OUTPATIENT)
Dept: UROLOGY | Facility: CLINIC | Age: 54
End: 2024-05-28
Payer: OTHER GOVERNMENT

## 2024-05-28 NOTE — TELEPHONE ENCOUNTER
Patient called and states he needs to reschedule his colonoscopy with Dr You, he states he can't do 7/11/24 that he would prefer anything after the 1st of August. Patient would like a call back.    no

## 2024-05-28 NOTE — TELEPHONE ENCOUNTER
RESCHEDULED FOR AUG 13 @ 6 AM, SCHEDULED WITH GOYO IN SURGERY SCHEDULING. PATIENT OKAY WITH DATE/TIME AND VOICED UNDERSTANDING.

## 2024-07-01 ENCOUNTER — TELEPHONE (OUTPATIENT)
Dept: SLEEP MEDICINE | Facility: HOSPITAL | Age: 54
End: 2024-07-01
Payer: OTHER GOVERNMENT

## 2024-07-01 NOTE — TELEPHONE ENCOUNTER
Left message for patient to schedule annual [facility] at [facility] at [phone], option 1 to schedule.

## 2024-08-07 ENCOUNTER — TELEPHONE (OUTPATIENT)
Dept: SURGERY | Facility: CLINIC | Age: 54
End: 2024-08-07
Payer: OTHER GOVERNMENT

## 2024-08-07 NOTE — TELEPHONE ENCOUNTER
PT HAS LOST HIS BOWEL PREP INSTRUCTIONS AND ARRIVAL TIME. HE IS ASKING IF THAT CAN BE UPLOADED TO MY CHART. HE IS SCHEDULED ON 8/13. HE IS GOING TO CHECK WITH [facility] TO SEE IF THEY STILL HAVE THE PREP.

## 2024-08-08 NOTE — PRE-PROCEDURE INSTRUCTIONS
"PAT call attempted.  No answer.  Detailed message with date and arrival time of 0600 given.  Instructed that arrival time is not procedure time but allows time to prepare for procedure. Come to entrance \"C\"; must have adult  for transportation home; may have two visitors; however, children under 12 must remain in waiting area; instructed on diet/clear liquids/NPO/bowel prep, if needed; may take normal meds two hours prior to arrival time except for blood thinners, antidiabetics, diuretics, and weight loss meds.  Instructed to return call to confirm receipt of instructions and for any questions.  "

## 2024-08-09 NOTE — PRE-PROCEDURE INSTRUCTIONS
Patient returned phone call and stated he understood all the instructions. Pt states he will call back with any questions.

## 2024-08-12 ENCOUNTER — ANESTHESIA EVENT (OUTPATIENT)
Dept: GASTROENTEROLOGY | Facility: HOSPITAL | Age: 54
End: 2024-08-12
Payer: OTHER GOVERNMENT

## 2024-08-12 NOTE — ANESTHESIA PREPROCEDURE EVALUATION
Anesthesia Evaluation     Patient summary reviewed and Nursing notes reviewed   NPO Solid Status: > 8 hours  NPO Liquid Status: > 2 hours           Airway   Mallampati: I  TM distance: >3 FB  Neck ROM: full  No difficulty expected  Dental          Pulmonary - normal exam    breath sounds clear to auscultation  (+) a smoker smokeless tobacco, asthma,sleep apnea on CPAP  Cardiovascular - normal exam  Exercise tolerance: excellent (>7 METS)    ECG reviewed  Rhythm: regular  Rate: normal    (+) hypertension well controlled, past MI (NSTMI in 2021 w/ 1 stent)  >12 months, CAD, hyperlipidemia    ROS comment: 7/20/22 EKG  HEART RATE= 63  bpm  RR Interval= 960  ms  CT Interval= 178  ms  P Horizontal Axis= 13  deg  P Front Axis= 51  deg  QRSD Interval= 90  ms  QT Interval= 365  ms  QRS Axis= -10  deg  T Wave Axis= 56  deg  - NORMAL ECG -  Sinus rhythm  When compared with ECG of 20-Jul-2022 15:54:54,  No significant change  Electronically Signed By: Tyler Remy ([facility]) 21-Jul-2022 12:38:16  Date and Time of Study: 2022-07-20 18:00:55      Neuro/Psych  (+) headaches (Hx Migraine HAs), tremors (hypnagogic foot tremor), numbness (sciatica), psychiatric history (decreased libido)  GI/Hepatic/Renal/Endo    (+) GERD well controlled, thyroid problem hypothyroidism    Musculoskeletal     Abdominal  - normal exam   Substance History      OB/GYN          Other   arthritis,     ROS/Med Hx Other: Note from April Brock APRN states that patient told her he no longer takes plavix and was cleared from cardiology.    [facility] - [facility] unavailable - given good bill of health.                     Anesthesia Plan    ASA 3     general   total IV anesthesia  (Total IV Anesthesia    Patient understands anesthesia not responsible for dental damage.  )  intravenous induction     Anesthetic plan, risks, benefits, and alternatives have been provided, discussed and informed consent has been obtained with:  patient.  Pre-procedure education provided  Plan discussed with CRNA.        CODE STATUS:

## 2024-08-13 ENCOUNTER — ANESTHESIA (OUTPATIENT)
Dept: GASTROENTEROLOGY | Facility: HOSPITAL | Age: 54
End: 2024-08-13
Payer: OTHER GOVERNMENT

## 2024-08-13 ENCOUNTER — HOSPITAL ENCOUNTER (OUTPATIENT)
Facility: HOSPITAL | Age: 54
Setting detail: HOSPITAL OUTPATIENT SURGERY
Discharge: HOME | End: 2024-08-13
Attending: SURGERY | Admitting: SURGERY
Payer: OTHER GOVERNMENT

## 2024-08-13 VITALS
SYSTOLIC BLOOD PRESSURE: 116 MMHG | BODY MASS INDEX: 28.89 KG/M2 | HEART RATE: 51 BPM | TEMPERATURE: 97.4 F | RESPIRATION RATE: 16 BRPM | OXYGEN SATURATION: 97 % | WEIGHT: 190.04 LBS | DIASTOLIC BLOOD PRESSURE: 79 MMHG

## 2024-08-13 DIAGNOSIS — Z12.11 SCREENING FOR MALIGNANT NEOPLASM OF COLON: ICD-10-CM

## 2024-08-13 PROCEDURE — 25810000003 LACTATED RINGERS PER 1000 ML: Performed by: NURSE ANESTHETIST, CERTIFIED REGISTERED

## 2024-08-13 PROCEDURE — 25010000002 PROPOFOL 10 MG/ML EMULSION: Performed by: NURSE ANESTHETIST, CERTIFIED REGISTERED

## 2024-08-13 RX ORDER — SODIUM CHLORIDE, SODIUM LACTATE, POTASSIUM CHLORIDE, CALCIUM CHLORIDE 600; 310; 30; 20 MG/100ML; MG/100ML; MG/100ML; MG/100ML
INJECTION, SOLUTION INTRAVENOUS CONTINUOUS PRN
Status: DISCONTINUED | OUTPATIENT
Start: 2024-08-13 | End: 2024-08-13 | Stop reason: SURG

## 2024-08-13 RX ORDER — SODIUM CHLORIDE 9 MG/ML
40 INJECTION, SOLUTION INTRAVENOUS AS NEEDED
Status: DISCONTINUED | OUTPATIENT
Start: 2024-08-13 | End: 2024-08-13 | Stop reason: HOSPADM

## 2024-08-13 RX ORDER — PROPOFOL 10 MG/ML
VIAL (ML) INTRAVENOUS AS NEEDED
Status: DISCONTINUED | OUTPATIENT
Start: 2024-08-13 | End: 2024-08-13 | Stop reason: SURG

## 2024-08-13 RX ORDER — LIDOCAINE HYDROCHLORIDE 20 MG/ML
INJECTION, SOLUTION EPIDURAL; INFILTRATION; INTRACAUDAL; PERINEURAL AS NEEDED
Status: DISCONTINUED | OUTPATIENT
Start: 2024-08-13 | End: 2024-08-13 | Stop reason: SURG

## 2024-08-13 RX ORDER — SODIUM CHLORIDE, SODIUM LACTATE, POTASSIUM CHLORIDE, CALCIUM CHLORIDE 600; 310; 30; 20 MG/100ML; MG/100ML; MG/100ML; MG/100ML
30 INJECTION, SOLUTION INTRAVENOUS CONTINUOUS
Status: DISCONTINUED | OUTPATIENT
Start: 2024-08-13 | End: 2024-08-13 | Stop reason: HOSPADM

## 2024-08-13 RX ORDER — SODIUM CHLORIDE 0.9 % (FLUSH) 0.9 %
10 SYRINGE (ML) INJECTION AS NEEDED
Status: DISCONTINUED | OUTPATIENT
Start: 2024-08-13 | End: 2024-08-13 | Stop reason: HOSPADM

## 2024-08-13 RX ORDER — SODIUM CHLORIDE 0.9 % (FLUSH) 0.9 %
3 SYRINGE (ML) INJECTION EVERY 12 HOURS SCHEDULED
Status: DISCONTINUED | OUTPATIENT
Start: 2024-08-13 | End: 2024-08-13 | Stop reason: HOSPADM

## 2024-08-13 RX ADMIN — SODIUM CHLORIDE, POTASSIUM CHLORIDE, SODIUM LACTATE AND CALCIUM CHLORIDE 30 ML/HR: 600; 310; 30; 20 INJECTION, SOLUTION INTRAVENOUS at 06:34

## 2024-08-13 RX ADMIN — PROPOFOL 200 MCG/KG/MIN: 10 INJECTION, EMULSION INTRAVENOUS at 07:45

## 2024-08-13 RX ADMIN — SODIUM CHLORIDE, POTASSIUM CHLORIDE, SODIUM LACTATE AND CALCIUM CHLORIDE: 600; 310; 30; 20 INJECTION, SOLUTION INTRAVENOUS at 07:42

## 2024-08-13 RX ADMIN — PROPOFOL 100 MG: 10 INJECTION, EMULSION INTRAVENOUS at 07:45

## 2024-08-13 RX ADMIN — LIDOCAINE HYDROCHLORIDE 50 MG: 20 INJECTION, SOLUTION INTRAVENOUS at 07:45

## 2024-08-13 NOTE — H&P
General Surgery/Colorectal Surgery Note    Patient Name:  Darrick Mooney  YOB: 1970  [id number]    Referring Provider: iRta Jim APRN      Patient Care Team:  Rita Jim APRN as PCP - General (Nurse Practitioner)  Zohra Nelson APRN as Nurse Practitioner (Nurse Practitioner)    Subjective .     History of present illness:    HPI   referral from Devorah Boone for colonoscopy consultation. Patient denies any abdominal pain, change in bowel habit, or rectal bleeding. Denies any family history of colorectal cancer. Patient reports his last colonoscopy was in 2009 and was normal.     History:  Past Medical History:   Diagnosis Date    Acid reflux     Arthritis     Asthma     Heart attack     Hyperlipidemia     Hypertension     Labral tear of hip joint     Migraine headache     RLS (restless legs syndrome)     Spinal stenosis at L4-L5 level CURRENTLY       Past Surgical History:   Procedure Laterality Date    APPENDECTOMY      COLONOSCOPY  06/05/2011    CORONARY STENT PLACEMENT      ELBOW ARTHROPLASTY Left 2014    NOSE SURGERY  2002    deviated septum       Family History   Problem Relation Age of Onset    Heart disease Mother     Sleep walking Mother     Sleep disorder Mother         night terrors    Heart disease Maternal Grandmother     Diabetes Maternal Grandmother         mellitus    Hypertension Maternal Grandmother     Sleep walking Maternal Grandmother     Sleep disorder Maternal Grandmother         NIght terrors    Cancer Paternal Grandmother     Cancer Other     Heart disease Other     Heart disease Other        Social History     Tobacco Use    Smoking status: Never    Smokeless tobacco: Current     Types: Chew    Tobacco comments:     1 can every 2 days   Vaping Use    Vaping status: Never Used   Substance Use Topics    Alcohol use: Yes     Comment: occaionally     Drug use: Never       Review of Systems: See HPI    Review of Systems            Medications Prior to Admission    Medication Sig Dispense Refill Last Dose    Evolocumab with Infusor (REPATHA) solution cartridge Inject 3.5 mL under the skin into the appropriate area as directed Every 30 (Thirty) Days. 3.5 mL 11 Past Month    levothyroxine (Synthroid) 125 MCG tablet Take 1 tablet by mouth Every Morning. 90 tablet 0 8/12/2024    aspirin 81 MG EC tablet Take 1 tablet by mouth Daily. 90 tablet 3 8/10/2024    cholecalciferol (VITAMIN D3) 25 MCG (1000 UT) tablet Take 1 tablet by mouth Daily.   8/10/2024    CO-ENZYME Q-10 PO Take  by mouth.   8/10/2024    Cyanocobalamin (VITAMIN B 12 PO) Take  by mouth.   8/10/2024    Probiotic Product (PROBIOTIC-10 PO) Take  by mouth.   8/10/2024    vitamin C (ASCORBIC ACID) 250 MG tablet Take 1 tablet by mouth Daily.   8/10/2024    Vitamin Mixture (VITAMIN E COMPLETE PO) Take  by mouth.   8/10/2024         Home Meds:      Prior to Admission medications    Medication Sig Start Date End Date Taking? Authorizing Provider   Evolocumab with Infusor (REPATHA) solution cartridge Inject 3.5 mL under the skin into the appropriate area as directed Every 30 (Thirty) Days. 3/3/23  Yes Laith Navarro MD   levothyroxine (Synthroid) 125 MCG tablet Take 1 tablet by mouth Every Morning. 2/1/23  Yes Wood Martines DO   simethicone (Gas-X) 80 MG chewable tablet Take 2 tablets PO after completing movi prep and 2 tablets PO 4 hours prior to procedure. 4/4/24 8/13/24 Yes Omar April, APRN   aspirin 81 MG EC tablet Take 1 tablet by mouth Daily. 3/3/23   Laith Navarro MD   cholecalciferol (VITAMIN D3) 25 MCG (1000 UT) tablet Take 1 tablet by mouth Daily.    ProviderAugie MD   CO-ENZYME Q-10 PO Take  by mouth.    Augie Rivera MD   Cyanocobalamin (VITAMIN B 12 PO) Take  by mouth.    Augie Rivera MD   Probiotic Product (PROBIOTIC-10 PO) Take  by mouth.    Augie Rivera MD   vitamin C (ASCORBIC ACID) 250 MG tablet Take 1 tablet by mouth Daily.    Augie Rivera MD    Vitamin Mixture (VITAMIN E COMPLETE PO) Take  by mouth.    Provider, MD Augie   clopidogrel (PLAVIX) 75 MG tablet Take 1 tablet by mouth Daily.  Patient not taking: Reported on 4/4/2024 3/3/23 8/13/24  Laith Navarro MD   Ginkgo Biloba (GINKOBA PO) Take  by mouth.  Patient not taking: Reported on 10/3/2023  8/13/24  Emergency, Nurse Epic, RN   Green Tea, Claribel sinensis, (GREEN  PO) Take  by mouth.  Patient not taking: Reported on 10/3/2023  8/13/24  ProviderAugie MD   pravastatin (PRAVACHOL) 10 MG tablet Take 1 tablet by mouth Daily.  8/13/24  ProviderAugie MD   rosuvastatin (CRESTOR) 20 MG tablet Take 1 tablet by mouth Daily.  Patient not taking: Reported on 4/4/2024 3/3/23 8/13/24  Laith Navarro MD            Allergies:  Patient has no known allergies.      Objective     Vital Signs   Temp:  [97.1 °F (36.2 °C)] 97.1 °F (36.2 °C)  Heart Rate:  [52] 52  Resp:  [16] 16  BP: (116)/(81) 116/81    Physical Exam:     Physical Exam    NAD, A/O x 4, normal circulation, normal respiration      Result Review :     Assessment & Plan     Diagnoses and all orders for this visit:    1. Screening for malignant neoplasm of colon  -     sodium chloride 0.9 % flush 3 mL  -     sodium chloride 0.9 % flush 10 mL  -     sodium chloride 0.9 % infusion 40 mL    Other orders  -     Continuous Pulse Oximetry; Standing  -     POC Glucose Once; Standing  -     Insert Peripheral IV; Standing  -     lactated ringers infusion  -     Follow Anesthesia Guidelines / Protocol; Standing  -     Verify Bowel Prep Was Successful; Standing  -     Give Tap Water Enema If Bowel Prep Insufficient; Standing  -     Insert Peripheral IV; Standing  -     Saline Lock & Maintain IV Access; Standing  -     Place Sequential Compression Device; Standing  -     Maintain Sequential Compression Device; Standing  -     Verify NPO Status; Standing  -     Obtain Informed Consent; Standing  -     Continuous Pulse Oximetry  -      POC Glucose Once  -     Insert Peripheral IV  -     Follow Anesthesia Guidelines / Protocol  -     Verify Bowel Prep Was Successful  -     Give Tap Water Enema If Bowel Prep Insufficient  -     Insert Peripheral IV  -     Saline Lock & Maintain IV Access  -     Place Sequential Compression Device  -     Maintain Sequential Compression Device  -     Verify NPO Status  -     Obtain Informed Consent           Risks including bleeding, perforation, pain, infection, missed polyp(s). Benefits, and alternatives of Colonoscopy discussed with patient.  All questions answered.  Consent verified.         Jareth You MD  08/13/24 07:39 EDT

## 2024-08-13 NOTE — ANESTHESIA POSTPROCEDURE EVALUATION
Patient: Darrick Mooney    Procedure Summary       Date: 08/13/24 Room / Location: [facility] ENDOSCOPY 3 / [facility]    Anesthesia Start: 0742 Anesthesia Stop: 0804    Procedure: COLONOSCOPY Diagnosis:       Screening for malignant neoplasm of colon      (Screening for malignant neoplasm of colon [Z12.11])    Surgeons: Jareth You MD Provider: Ela Hardy CRNA    Anesthesia Type: general ASA Status: 3            Anesthesia Type: general    Vitals  Vitals Value Taken Time   /79 08/13/24 0820   Temp 36.3 °C (97.4 °F) 08/13/24 0820   Pulse 49 08/13/24 0823   Resp 16 08/13/24 0820   SpO2 98 % 08/13/24 0823   Vitals shown include unfiled device data.        Post Anesthesia Care and Evaluation    Patient location during evaluation: bedside  Patient participation: complete - patient participated  Level of consciousness: awake  Pain management: adequate    Airway patency: patent  Anesthetic complications: No anesthetic complications  PONV Status: controlled  Cardiovascular status: acceptable and stable  Respiratory status: acceptable

## 2025-07-31 ENCOUNTER — HOSPITAL ENCOUNTER (EMERGENCY)
Facility: HOSPITAL | Age: 55
Discharge: HOME | End: 2025-07-31
Attending: EMERGENCY MEDICINE
Payer: OTHER GOVERNMENT

## 2025-07-31 ENCOUNTER — APPOINTMENT (OUTPATIENT)
Dept: CT IMAGING | Facility: HOSPITAL | Age: 55
End: 2025-07-31
Payer: OTHER GOVERNMENT

## 2025-07-31 VITALS
TEMPERATURE: 98.1 F | WEIGHT: 195.55 LBS | HEIGHT: 68 IN | DIASTOLIC BLOOD PRESSURE: 82 MMHG | HEART RATE: 68 BPM | BODY MASS INDEX: 29.64 KG/M2 | SYSTOLIC BLOOD PRESSURE: 151 MMHG | OXYGEN SATURATION: 100 % | RESPIRATION RATE: 18 BRPM

## 2025-07-31 DIAGNOSIS — H53.9 VISION DISTURBANCE: Primary | ICD-10-CM

## 2025-07-31 PROCEDURE — 70450 CT HEAD/BRAIN W/O DYE: CPT

## 2025-07-31 PROCEDURE — 99284 EMERGENCY DEPT VISIT MOD MDM: CPT

## 2025-07-31 NOTE — ED PROVIDER NOTES
Time: 3:51 PM EDT  Date of encounter:  7/31/2025  Independent Historian/Clinical History and Information was obtained by:   Patient    History is limited by: N/A    Chief Complaint: Right eye visual disturbance      History of Present Illness:  Patient is a 55 y.o. year old male who presents to the emergency department for evaluation of intermittent right eye visual disturbance that began 2 weeks ago.  Patient denies use of contact lenses.  Patient denies pain behind the right eye and discharge.      Patient Care Team  Primary Care Provider: Rita Jim APRN    Past Medical History:     No Known Allergies  Past Medical History:   Diagnosis Date    Acid reflux     Arthritis     Asthma     Heart attack     Hyperlipidemia     Hypertension     Labral tear of hip joint     Migraine headache     RLS (restless legs syndrome)     Spinal stenosis at L4-L5 level CURRENTLY     Past Surgical History:   Procedure Laterality Date    APPENDECTOMY      COLONOSCOPY  06/05/2011    COLONOSCOPY N/A 8/13/2024    Procedure: COLONOSCOPY;  Surgeon: Jareth You MD;  Location: [facility] ENDOSCOPY;  Service: General;  Laterality: N/A;  NORMAL    CORONARY STENT PLACEMENT      ELBOW ARTHROPLASTY Left 2014    NOSE SURGERY  2002    deviated septum     Family History   Problem Relation Age of Onset    Heart disease Mother     Sleep walking Mother     Sleep disorder Mother         night terrors    Heart disease Maternal Grandmother     Diabetes Maternal Grandmother         mellitus    Hypertension Maternal Grandmother     Sleep walking Maternal Grandmother     Sleep disorder Maternal Grandmother         NIght terrors    Cancer Paternal Grandmother     Cancer Other     Heart disease Other     Heart disease Other        Home Medications:  Prior to Admission medications    Medication Sig Start Date End Date Taking? Authorizing Provider   aspirin 81 MG EC tablet Take 1 tablet by mouth Daily. 3/3/23   Laith Navarro MD   cholecalciferol  (VITAMIN D3) 25 MCG (1000 UT) tablet Take 1 tablet by mouth Daily.    Augie Rivera MD   CO-ENZYME Q-10 PO Take  by mouth.    Augie Rivera MD   Cyanocobalamin (VITAMIN B 12 PO) Take  by mouth.    Augie Rivera MD   Evolocumab with Infusor (REPATHA) solution cartridge Inject 3.5 mL under the skin into the appropriate area as directed Every 30 (Thirty) Days. 3/3/23   Laith Navarro MD   levothyroxine (Synthroid) 125 MCG tablet Take 1 tablet by mouth Every Morning. 2/1/23   Wood Martines DO   Probiotic Product (PROBIOTIC-10 PO) Take  by mouth.    Augie Rivera MD   vitamin C (ASCORBIC ACID) 250 MG tablet Take 1 tablet by mouth Daily.    Augie Rivera MD   Vitamin Mixture (VITAMIN E COMPLETE PO) Take  by mouth.    Augie Rivera MD        Social History:   Social History     Tobacco Use    Smoking status: Never    Smokeless tobacco: Current     Types: Chew    Tobacco comments:     1 can every 2 days   Vaping Use    Vaping status: Never Used   Substance Use Topics    Alcohol use: Yes     Comment: occaionally     Drug use: Never         Review of Systems:  Review of Systems   Constitutional:  Negative for chills and fever.   HENT:  Negative for congestion, rhinorrhea and sore throat.    Eyes:  Positive for visual disturbance. Negative for pain.   Respiratory:  Negative for apnea, cough, chest tightness and shortness of breath.    Cardiovascular:  Negative for chest pain and palpitations.   Gastrointestinal:  Negative for abdominal pain, diarrhea, nausea and vomiting.   Genitourinary:  Negative for difficulty urinating and dysuria.   Musculoskeletal:  Negative for joint swelling and myalgias.   Skin:  Negative for color change.   Neurological:  Negative for seizures and headaches.   Psychiatric/Behavioral: Negative.     All other systems reviewed and are negative.       Physical Exam:  /82 (BP Location: Left arm, Patient Position: Sitting)   Pulse 68    "Temp 98.1 °F (36.7 °C) (Oral)   Resp 18   Ht 172.7 cm (68\")   Wt 88.7 kg (195 lb 8.8 oz)   SpO2 100%   BMI 29.73 kg/m²     Physical Exam  Vitals and nursing note reviewed.   Constitutional:       General: He is not in acute distress.     Appearance: Normal appearance. He is not toxic-appearing.   HENT:      Head: Normocephalic and atraumatic.      Jaw: There is normal jaw occlusion.   Eyes:      General: Lids are normal.      Extraocular Movements: Extraocular movements intact.      Conjunctiva/sclera: Conjunctivae normal.      Pupils: Pupils are equal, round, and reactive to light.   Cardiovascular:      Rate and Rhythm: Normal rate and regular rhythm.      Pulses: Normal pulses.      Heart sounds: Normal heart sounds.   Pulmonary:      Effort: Pulmonary effort is normal. No respiratory distress.      Breath sounds: Normal breath sounds. No wheezing or rhonchi.   Abdominal:      General: Abdomen is flat.      Palpations: Abdomen is soft.      Tenderness: There is no abdominal tenderness. There is no guarding or rebound.   Musculoskeletal:         General: Normal range of motion.      Cervical back: Normal range of motion and neck supple.      Right lower leg: No edema.      Left lower leg: No edema.   Skin:     General: Skin is warm and dry.   Neurological:      Mental Status: He is alert and oriented to person, place, and time. Mental status is at baseline.   Psychiatric:         Mood and Affect: Mood normal.                    Medical Decision Making:      Comorbidities that affect care:    Hyperlipidemia, hypertension, Asthma    External Notes reviewed:          The following orders were placed and all results were independently analyzed by me:  Orders Placed This Encounter   Procedures    CT Head Without Contrast       Medications Given in the Emergency Department:  Medications - No data to display     ED Course:         Labs:    Lab Results (last 24 hours)       ** No results found for the last 24 hours. " **             Imaging:    CT Head Without Contrast  Result Date: 7/31/2025  CT HEAD WO CONTRAST Date of Exam: 7/31/2025 3:38 PM EDT Indication: vision change. Comparison: None available. Technique: Axial CT images were obtained of the head without contrast administration.  Reconstructed coronal and sagittal images were also obtained. Automated exposure control and iterative construction methods were used. Findings: There is no evidence of hemorrhage. There is no mass effect or midline shift. There is no extracerebral collection. Ventricles are normal in size and configuration for patient's stated age. Posterior fossa is within normal limits. Calvarium and skull base appear intact.  Visualized sinuses show no air fluid levels. Visualized orbits are unremarkable.     Impression: No acute intracranial abnormality identified. Electronically Signed: Hair Bowers MD  7/31/2025 3:42 PM EDT  Workstation ID: [id number]        Differential Diagnosis and Discussion:    Eye Pain/Blurred Vision: Differential diagnosis includes but is not limited to dacryocystitis, hordeolum, chalazion, periorbital cellulitis, cavernous sinus thrombosis, blepharitis, and glaucoma.    PROCEDURES:    CT scan was performed in the emergency department and the CT scan radiology impression was interpreted by me.    No orders to display       Procedures    MDM     Amount and/or Complexity of Data Reviewed  Tests in the radiology section of CPT®: reviewed         CT shows no acute intracranial abnormality.  Visual acuity unremarkable.  Patient denies headache.  Neuroexam unremarkable.  Instructed patient to follow-up with [facility] [place].  Instructed patient to return to ED in the meantime if he develops any new or worsening symptoms.  Patient states he understands and agrees with plan of care.              Patient Care Considerations:          Consultants/Shared Management Plan:    None    Social Determinants of  Health:          Disposition and Care Coordination:    Discharged: The patient is suitable and stable for discharge with no need for consideration of admission.    I have explained the patient´s condition, diagnoses and treatment plan based on the information available to me at this time. I have answered questions and addressed any concerns. The patient has a good  understanding of the patient´s diagnosis, condition, and treatment plan as can be expected at this point. The vital signs have been stable. The patient´s condition is stable and appropriate for discharge from the emergency department.      The patient will pursue further outpatient evaluation with the primary care physician or other designated or consulting physician as outlined in the discharge instructions. They are agreeable to this plan of care and follow-up instructions have been explained in detail. The patient has received these instructions in written format and has expressed an understanding of the discharge instructions. The patient is aware that any significant change in condition or worsening of symptoms should prompt an immediate return to this or the closest emergency department or call to 911.  I have explained discharge medications and the need for follow up with the patient/caretakers. This was also printed in the discharge instructions. Patient was discharged with the following medications and follow up:      Medication List      No changes were made to your prescriptions during this visit.      [facility] [place] [id number] [phone]  Call in 1 day  To schedule follow-up       Final diagnoses:   Vision disturbance        ED Disposition       ED Disposition   Discharge    Condition   Stable    Comment   --               This medical record created using voice recognition software.             Vijay Aviles PA-C  07/31/25 [id number]

## (undated) DEVICE — STERILE POLYISOPRENE POWDER-FREE SURGICAL GLOVES: Brand: PROTEXIS

## (undated) DEVICE — CONN JET HYDRA H20 AUXILIARY DISP

## (undated) DEVICE — Device

## (undated) DEVICE — TUBING, SUCTION, 1/4" X 10', STRAIGHT: Brand: MEDLINE

## (undated) DEVICE — SOLIDIFIER LIQLOC PLS 1500CC BT

## (undated) DEVICE — SOL IRRG H2O PL/BG 1000ML STRL

## (undated) DEVICE — Device: Brand: DEFENDO AIR/WATER/SUCTION AND BIOPSY VALVE

## (undated) DEVICE — STERILE POLYISOPRENE POWDER-FREE SURGICAL GLOVES WITH EMOLLIENT COATING: Brand: PROTEXIS

## (undated) DEVICE — LINER SURG CANSTR SXN S/RIGD 1500CC

## (undated) DEVICE — SOL IRR H2O BTL 1000ML STRL